# Patient Record
Sex: FEMALE | Race: WHITE | NOT HISPANIC OR LATINO | Employment: UNEMPLOYED | URBAN - METROPOLITAN AREA
[De-identification: names, ages, dates, MRNs, and addresses within clinical notes are randomized per-mention and may not be internally consistent; named-entity substitution may affect disease eponyms.]

---

## 2023-04-26 DIAGNOSIS — E78.5 DYSLIPIDEMIA: Primary | ICD-10-CM

## 2023-04-26 PROBLEM — K86.1 CHRONIC PANCREATITIS (MULTI): Status: ACTIVE | Noted: 2023-04-26

## 2023-04-26 PROBLEM — M54.16 LUMBAR RADICULAR PAIN: Status: ACTIVE | Noted: 2023-04-26

## 2023-04-26 PROBLEM — G89.29 BACK PAIN, CHRONIC: Status: ACTIVE | Noted: 2023-04-26

## 2023-04-26 PROBLEM — M15.9 OSTEOARTHRITIS OF MULTIPLE JOINTS: Status: ACTIVE | Noted: 2023-04-26

## 2023-04-26 PROBLEM — R48.2 APRAXIA OF SPEECH: Status: ACTIVE | Noted: 2023-04-26

## 2023-04-26 PROBLEM — M50.33 OTHER CERVICAL DISC DEGENERATION, CERVICOTHORACIC REGION: Status: ACTIVE | Noted: 2023-04-26

## 2023-04-26 PROBLEM — R53.83 FATIGUE: Status: ACTIVE | Noted: 2023-04-26

## 2023-04-26 PROBLEM — F10.21 HISTORY OF ALCOHOLISM (MULTI): Status: ACTIVE | Noted: 2023-04-26

## 2023-04-26 PROBLEM — R25.9 ABNORMAL MOVEMENT: Status: ACTIVE | Noted: 2023-04-26

## 2023-04-26 PROBLEM — M62.81 MUSCLE WEAKNESS: Status: ACTIVE | Noted: 2023-04-26

## 2023-04-26 PROBLEM — R22.0 FACIAL SWELLING: Status: ACTIVE | Noted: 2023-04-26

## 2023-04-26 PROBLEM — M24.159 LABRAL TEAR OF HIP, DEGENERATIVE: Status: ACTIVE | Noted: 2023-04-26

## 2023-04-26 PROBLEM — K21.9 GERD (GASTROESOPHAGEAL REFLUX DISEASE): Status: ACTIVE | Noted: 2023-04-26

## 2023-04-26 PROBLEM — R10.31 ABDOMINAL PAIN, ACUTE, RIGHT LOWER QUADRANT: Status: ACTIVE | Noted: 2023-04-26

## 2023-04-26 PROBLEM — R10.84 GENERALIZED ABDOMINAL PAIN: Status: ACTIVE | Noted: 2023-04-26

## 2023-04-26 PROBLEM — M25.50 MULTIPLE JOINT PAIN: Status: ACTIVE | Noted: 2023-04-26

## 2023-04-26 PROBLEM — M54.9 BACK PAIN, CHRONIC: Status: ACTIVE | Noted: 2023-04-26

## 2023-04-26 PROBLEM — G37.9 CNS DEMYELINATION (MULTI): Status: ACTIVE | Noted: 2023-04-26

## 2023-04-26 PROBLEM — R10.31 RIGHT GROIN PAIN: Status: ACTIVE | Noted: 2023-04-26

## 2023-04-26 PROBLEM — R90.89 ABNORMAL BRAIN MRI: Status: ACTIVE | Noted: 2023-04-26

## 2023-04-26 PROBLEM — H55.00 NYSTAGMUS: Status: ACTIVE | Noted: 2023-04-26

## 2023-04-26 PROBLEM — E55.9 VITAMIN D DEFICIENCY: Status: ACTIVE | Noted: 2023-04-26

## 2023-04-26 PROBLEM — R25.2 SPASTICITY: Status: ACTIVE | Noted: 2023-04-26

## 2023-04-26 PROBLEM — R11.2 NAUSEA AND VOMITING: Status: ACTIVE | Noted: 2023-04-26

## 2023-04-26 PROBLEM — R20.0 RIGHT ARM NUMBNESS: Status: ACTIVE | Noted: 2023-04-26

## 2023-04-26 PROBLEM — M99.01 CERVICAL SOMATIC DYSFUNCTION: Status: ACTIVE | Noted: 2023-04-26

## 2023-04-26 PROBLEM — F44.4 FUNCTIONAL NEUROLOGICAL SYMPTOM DISORDER WITH ABNORMAL MOVEMENT: Status: ACTIVE | Noted: 2023-04-26

## 2023-04-26 PROBLEM — M51.34 DEGENERATION OF INTERVERTEBRAL DISC OF THORACIC REGION: Status: ACTIVE | Noted: 2023-04-26

## 2023-04-26 PROBLEM — M79.7 FIBROMYALGIA: Status: ACTIVE | Noted: 2023-04-26

## 2023-04-26 PROBLEM — R13.12 OROPHARYNGEAL DYSPHAGIA: Status: ACTIVE | Noted: 2023-04-26

## 2023-04-26 PROBLEM — R10.13 ACUTE EPIGASTRIC PAIN: Status: ACTIVE | Noted: 2023-04-26

## 2023-04-26 PROBLEM — F12.90 MARIJUANA USE: Status: ACTIVE | Noted: 2023-04-26

## 2023-04-26 PROBLEM — M79.18 MYOFASCIAL PAIN SYNDROME: Status: ACTIVE | Noted: 2023-04-26

## 2023-04-26 PROBLEM — M25.551 HIP PAIN, RIGHT: Status: ACTIVE | Noted: 2023-04-26

## 2023-04-26 PROBLEM — R52 CHRONIC PAIN OF MULTIPLE SITES: Status: ACTIVE | Noted: 2023-04-26

## 2023-04-26 PROBLEM — S40.019A SHOULDER CONTUSION: Status: ACTIVE | Noted: 2023-04-26

## 2023-04-26 PROBLEM — M75.01 ADHESIVE CAPSULITIS OF RIGHT SHOULDER: Status: ACTIVE | Noted: 2023-04-26

## 2023-04-26 PROBLEM — M54.12 CERVICAL RADICULOPATHY: Status: ACTIVE | Noted: 2023-04-26

## 2023-04-26 PROBLEM — K29.00 ACUTE SUPERFICIAL GASTRITIS: Status: ACTIVE | Noted: 2023-04-26

## 2023-04-26 PROBLEM — M47.814 THORACIC SPONDYLOSIS: Status: ACTIVE | Noted: 2023-04-26

## 2023-04-26 PROBLEM — G56.02 CARPAL TUNNEL SYNDROME OF LEFT WRIST: Status: ACTIVE | Noted: 2023-04-26

## 2023-04-26 PROBLEM — G25.9 EXTRAPYRAMIDAL MOVEMENTS PRESENT: Status: ACTIVE | Noted: 2023-04-26

## 2023-04-26 PROBLEM — G35 MULTIPLE SCLEROSIS (MULTI): Status: ACTIVE | Noted: 2023-04-26

## 2023-04-26 PROBLEM — G25.9 MOVEMENT DISORDER: Status: ACTIVE | Noted: 2023-04-26

## 2023-04-26 PROBLEM — M51.369 OTHER INTERVERTEBRAL DISC DEGENERATION, LUMBAR REGION: Status: ACTIVE | Noted: 2023-04-26

## 2023-04-26 PROBLEM — M51.9 LUMBAR DISC DISEASE: Status: ACTIVE | Noted: 2023-04-26

## 2023-04-26 PROBLEM — S76.011A TEAR OF RIGHT GLUTEUS MEDIUS TENDON: Status: ACTIVE | Noted: 2023-04-26

## 2023-04-26 PROBLEM — R29.898 WEAKNESS OF RIGHT LOWER EXTREMITY: Status: ACTIVE | Noted: 2023-04-26

## 2023-04-26 PROBLEM — M51.36 OTHER INTERVERTEBRAL DISC DEGENERATION, LUMBAR REGION: Status: ACTIVE | Noted: 2023-04-26

## 2023-04-26 PROBLEM — R63.4 WEIGHT LOSS, ABNORMAL: Status: ACTIVE | Noted: 2023-04-26

## 2023-04-26 PROBLEM — M62.838 MUSCLE SPASM: Status: ACTIVE | Noted: 2023-04-26

## 2023-04-26 PROBLEM — M54.6 THORACIC SPINE PAIN: Status: ACTIVE | Noted: 2023-04-26

## 2023-04-26 PROBLEM — M99.03 SOMATIC DYSFUNCTION OF LUMBAR REGION: Status: ACTIVE | Noted: 2023-04-26

## 2023-04-26 PROBLEM — M25.562 LEFT KNEE PAIN: Status: ACTIVE | Noted: 2023-04-26

## 2023-04-26 PROBLEM — C52: Status: ACTIVE | Noted: 2023-04-26

## 2023-04-26 PROBLEM — M99.02 SEGMENTAL AND SOMATIC DYSFUNCTION OF THORACIC REGION: Status: ACTIVE | Noted: 2023-04-26

## 2023-04-26 PROBLEM — C79.82: Status: ACTIVE | Noted: 2023-04-26

## 2023-04-26 PROBLEM — M47.892 OTHER SPONDYLOSIS, CERVICAL REGION: Status: ACTIVE | Noted: 2023-04-26

## 2023-04-26 PROBLEM — K86.9 DISORDER OF PANCREATIC DUCT (HHS-HCC): Status: ACTIVE | Noted: 2023-04-26

## 2023-04-26 PROBLEM — M47.816 LUMBAR SPONDYLOSIS: Status: ACTIVE | Noted: 2023-04-26

## 2023-04-26 PROBLEM — M25.511 RIGHT SHOULDER PAIN: Status: ACTIVE | Noted: 2023-04-26

## 2023-04-26 PROBLEM — G93.9 CHRONIC NON-SPECIFIC WHITE MATTER LESIONS ON MRI: Status: ACTIVE | Noted: 2023-04-26

## 2023-04-26 PROBLEM — M79.10 MYALGIA: Status: ACTIVE | Noted: 2023-04-26

## 2023-04-26 PROBLEM — E05.90 HYPERTHYROIDISM: Status: ACTIVE | Noted: 2023-04-26

## 2023-04-26 PROBLEM — R93.5 ABDOMINAL ULTRASOUND, ABNORMAL: Status: ACTIVE | Noted: 2023-04-26

## 2023-04-26 PROBLEM — M79.609 PAIN IN EXTREMITY: Status: ACTIVE | Noted: 2023-04-26

## 2023-04-26 PROBLEM — N32.81 OAB (OVERACTIVE BLADDER): Status: ACTIVE | Noted: 2023-04-26

## 2023-04-26 PROBLEM — S46.919A SHOULDER STRAIN: Status: ACTIVE | Noted: 2023-04-26

## 2023-04-26 PROBLEM — M70.61 GREATER TROCHANTERIC BURSITIS, RIGHT: Status: ACTIVE | Noted: 2023-04-26

## 2023-04-26 PROBLEM — G95.9 CERVICAL MYELOPATHY (MULTI): Status: ACTIVE | Noted: 2023-04-26

## 2023-04-26 PROBLEM — R21 RASH: Status: ACTIVE | Noted: 2023-04-26

## 2023-04-26 PROBLEM — R29.2 HYPER REFLEXIA: Status: ACTIVE | Noted: 2023-04-26

## 2023-04-26 PROBLEM — R27.8 DECREASED COORDINATION: Status: ACTIVE | Noted: 2023-04-26

## 2023-04-26 PROBLEM — R29.898 RIGHT ARM WEAKNESS: Status: ACTIVE | Noted: 2023-04-26

## 2023-04-26 PROBLEM — G89.29 CHRONIC PAIN OF MULTIPLE SITES: Status: ACTIVE | Noted: 2023-04-26

## 2023-04-26 PROBLEM — R13.10 DYSPHAGIA: Status: ACTIVE | Noted: 2023-04-26

## 2023-04-26 PROBLEM — R10.11 ABDOMINAL PAIN, ACUTE, RIGHT UPPER QUADRANT: Status: ACTIVE | Noted: 2023-04-26

## 2023-04-26 PROBLEM — G52.1 GLOSSOPHARYNGEAL NERVE DISORDER: Status: ACTIVE | Noted: 2023-04-26

## 2023-04-26 PROBLEM — F41.8 DEPRESSION WITH ANXIETY: Status: ACTIVE | Noted: 2023-04-26

## 2023-04-26 RX ORDER — ATORVASTATIN CALCIUM 40 MG/1
TABLET, FILM COATED ORAL
Qty: 90 TABLET | Refills: 0 | Status: SHIPPED | OUTPATIENT
Start: 2023-04-26 | End: 2023-09-08 | Stop reason: SDUPTHER

## 2023-05-22 ENCOUNTER — TELEPHONE (OUTPATIENT)
Dept: PRIMARY CARE | Facility: CLINIC | Age: 53
End: 2023-05-22
Payer: MEDICARE

## 2023-05-22 DIAGNOSIS — R11.0 NAUSEA: ICD-10-CM

## 2023-05-22 DIAGNOSIS — N30.00 ACUTE CYSTITIS WITHOUT HEMATURIA: Primary | ICD-10-CM

## 2023-05-22 RX ORDER — PROMETHAZINE HYDROCHLORIDE 12.5 MG/1
12.5 TABLET ORAL EVERY 6 HOURS PRN
Qty: 30 TABLET | Refills: 0 | Status: SHIPPED | OUTPATIENT
Start: 2023-05-22 | End: 2023-05-23 | Stop reason: SDUPTHER

## 2023-05-22 RX ORDER — CIPROFLOXACIN 500 MG/1
500 TABLET ORAL 2 TIMES DAILY
Qty: 10 TABLET | Refills: 0 | Status: SHIPPED | OUTPATIENT
Start: 2023-05-22 | End: 2023-05-23 | Stop reason: SDUPTHER

## 2023-05-22 NOTE — TELEPHONE ENCOUNTER
Pt states she was in the hospital and found out she had a UTI. She is now having the same symptoms again. Are you able to call in an antibiotic, or do you need to see her?  They also gave her something for nausea from the UTI. Could you please call that in as well? It is promethazine HCL 12.5 mg

## 2023-05-23 RX ORDER — CIPROFLOXACIN 500 MG/1
500 TABLET ORAL 2 TIMES DAILY
Qty: 10 TABLET | Refills: 0 | Status: SHIPPED | OUTPATIENT
Start: 2023-05-23 | End: 2023-05-28

## 2023-05-23 RX ORDER — PROMETHAZINE HYDROCHLORIDE 12.5 MG/1
12.5 TABLET ORAL EVERY 6 HOURS PRN
Qty: 30 TABLET | Refills: 0 | Status: SHIPPED | OUTPATIENT
Start: 2023-05-23 | End: 2023-05-30

## 2023-05-25 LAB — URINE CULTURE: NO GROWTH

## 2023-06-17 DIAGNOSIS — G56.02 CARPAL TUNNEL SYNDROME OF LEFT WRIST: Primary | ICD-10-CM

## 2023-06-19 RX ORDER — GABAPENTIN 100 MG/1
CAPSULE ORAL
Qty: 270 CAPSULE | Refills: 1 | Status: SHIPPED | OUTPATIENT
Start: 2023-06-19 | End: 2023-11-14 | Stop reason: SDUPTHER

## 2023-06-28 LAB
BACTERIA, URINE: ABNORMAL /HPF
MUCUS, URINE: ABNORMAL /LPF
RBC, URINE: 1 /HPF (ref 0–5)
SQUAMOUS EPITHELIAL CELLS, URINE: 1 /HPF
WBC, URINE: <1 /HPF (ref 0–5)

## 2023-06-29 LAB — URINE CULTURE: NO GROWTH

## 2023-08-03 LAB — URINE CULTURE: NORMAL

## 2023-09-08 DIAGNOSIS — E78.5 DYSLIPIDEMIA: ICD-10-CM

## 2023-09-08 RX ORDER — ATORVASTATIN CALCIUM 40 MG/1
40 TABLET, FILM COATED ORAL NIGHTLY
Qty: 90 TABLET | Refills: 0 | Status: SHIPPED | OUTPATIENT
Start: 2023-09-08 | End: 2023-10-12

## 2023-09-22 ENCOUNTER — HOSPITAL ENCOUNTER (OUTPATIENT)
Facility: HOSPITAL | Age: 53
Setting detail: OUTPATIENT SURGERY
End: 2023-09-22
Attending: STUDENT IN AN ORGANIZED HEALTH CARE EDUCATION/TRAINING PROGRAM | Admitting: STUDENT IN AN ORGANIZED HEALTH CARE EDUCATION/TRAINING PROGRAM
Payer: MEDICARE

## 2023-10-05 ENCOUNTER — HOSPITAL ENCOUNTER (OUTPATIENT)
Dept: RADIOLOGY | Facility: HOSPITAL | Age: 53
Discharge: HOME | End: 2023-10-05
Payer: MEDICARE

## 2023-10-05 DIAGNOSIS — M25.571 PAIN IN RIGHT ANKLE AND JOINTS OF RIGHT FOOT: ICD-10-CM

## 2023-10-05 PROCEDURE — 73610 X-RAY EXAM OF ANKLE: CPT | Mod: RT,FY

## 2023-10-05 PROCEDURE — 73610 X-RAY EXAM OF ANKLE: CPT | Mod: RIGHT SIDE | Performed by: RADIOLOGY

## 2023-10-10 DIAGNOSIS — E78.5 DYSLIPIDEMIA: ICD-10-CM

## 2023-10-12 RX ORDER — ATORVASTATIN CALCIUM 40 MG/1
40 TABLET, FILM COATED ORAL NIGHTLY
Qty: 90 TABLET | Refills: 3 | Status: SHIPPED | OUTPATIENT
Start: 2023-10-12 | End: 2023-11-14 | Stop reason: SDUPTHER

## 2023-10-20 ENCOUNTER — LAB (OUTPATIENT)
Dept: LAB | Facility: LAB | Age: 53
End: 2023-10-20
Payer: MEDICARE

## 2023-10-20 ENCOUNTER — TELEPHONE (OUTPATIENT)
Dept: UROLOGY | Facility: CLINIC | Age: 53
End: 2023-10-20

## 2023-10-20 DIAGNOSIS — R30.0 DYSURIA: Primary | ICD-10-CM

## 2023-10-20 DIAGNOSIS — R30.0 DYSURIA: ICD-10-CM

## 2023-10-20 PROCEDURE — 87186 SC STD MICRODIL/AGAR DIL: CPT

## 2023-10-20 PROCEDURE — 87086 URINE CULTURE/COLONY COUNT: CPT

## 2023-10-24 DIAGNOSIS — N39.0 RECURRENT UTI: Primary | ICD-10-CM

## 2023-10-24 LAB — BACTERIA UR CULT: ABNORMAL

## 2023-10-24 RX ORDER — NITROFURANTOIN 25; 75 MG/1; MG/1
CAPSULE ORAL
Qty: 100 CAPSULE | Refills: 0 | Status: SHIPPED
Start: 2023-10-24 | End: 2024-02-07 | Stop reason: ALTCHOICE

## 2023-11-03 ENCOUNTER — HOSPITAL ENCOUNTER (OUTPATIENT)
Dept: RADIOLOGY | Facility: HOSPITAL | Age: 53
Discharge: HOME | End: 2023-11-03
Payer: MEDICARE

## 2023-11-03 DIAGNOSIS — M19.071 PRIMARY OSTEOARTHRITIS, RIGHT ANKLE AND FOOT: ICD-10-CM

## 2023-11-03 DIAGNOSIS — M76.61 ACHILLES TENDINITIS, RIGHT LEG: ICD-10-CM

## 2023-11-03 DIAGNOSIS — M25.571 PAIN IN RIGHT ANKLE AND JOINTS OF RIGHT FOOT: ICD-10-CM

## 2023-11-03 DIAGNOSIS — M79.671 PAIN IN RIGHT FOOT: ICD-10-CM

## 2023-11-03 DIAGNOSIS — M76.71 PERONEAL TENDINITIS, RIGHT LEG: ICD-10-CM

## 2023-11-03 PROCEDURE — 73630 X-RAY EXAM OF FOOT: CPT | Mod: RT,FY

## 2023-11-03 PROCEDURE — 73610 X-RAY EXAM OF ANKLE: CPT | Mod: RIGHT SIDE | Performed by: RADIOLOGY

## 2023-11-03 PROCEDURE — 73610 X-RAY EXAM OF ANKLE: CPT | Mod: RT

## 2023-11-03 PROCEDURE — 73630 X-RAY EXAM OF FOOT: CPT | Mod: RIGHT SIDE | Performed by: RADIOLOGY

## 2023-11-14 ENCOUNTER — OFFICE VISIT (OUTPATIENT)
Dept: PRIMARY CARE | Facility: CLINIC | Age: 53
End: 2023-11-14
Payer: MEDICARE

## 2023-11-14 VITALS
SYSTOLIC BLOOD PRESSURE: 112 MMHG | WEIGHT: 145 LBS | HEART RATE: 81 BPM | HEIGHT: 63 IN | BODY MASS INDEX: 25.69 KG/M2 | DIASTOLIC BLOOD PRESSURE: 68 MMHG

## 2023-11-14 DIAGNOSIS — G56.02 CARPAL TUNNEL SYNDROME OF LEFT WRIST: ICD-10-CM

## 2023-11-14 DIAGNOSIS — I10 PRIMARY HYPERTENSION: Primary | ICD-10-CM

## 2023-11-14 DIAGNOSIS — F10.21 HISTORY OF ALCOHOLISM (MULTI): ICD-10-CM

## 2023-11-14 DIAGNOSIS — G95.9 CERVICAL MYELOPATHY (MULTI): ICD-10-CM

## 2023-11-14 DIAGNOSIS — C52: ICD-10-CM

## 2023-11-14 DIAGNOSIS — G37.9 CNS DEMYELINATION (MULTI): ICD-10-CM

## 2023-11-14 DIAGNOSIS — Z12.31 BREAST CANCER SCREENING BY MAMMOGRAM: ICD-10-CM

## 2023-11-14 DIAGNOSIS — Z13.220 LIPID SCREENING: ICD-10-CM

## 2023-11-14 DIAGNOSIS — Z00.00 MEDICARE ANNUAL WELLNESS VISIT, SUBSEQUENT: ICD-10-CM

## 2023-11-14 DIAGNOSIS — E55.9 VITAMIN D DEFICIENCY: ICD-10-CM

## 2023-11-14 DIAGNOSIS — E78.5 DYSLIPIDEMIA: ICD-10-CM

## 2023-11-14 DIAGNOSIS — C79.82: ICD-10-CM

## 2023-11-14 DIAGNOSIS — I73.9 PERIPHERAL VASCULAR DISEASE, UNSPECIFIED (CMS-HCC): ICD-10-CM

## 2023-11-14 DIAGNOSIS — E05.90 HYPERTHYROIDISM: ICD-10-CM

## 2023-11-14 PROBLEM — L40.9 SCALP PSORIASIS: Status: RESOLVED | Noted: 2023-11-14 | Resolved: 2023-11-14

## 2023-11-14 PROBLEM — M79.609 PAIN IN EXTREMITY: Status: RESOLVED | Noted: 2023-11-14 | Resolved: 2023-11-14

## 2023-11-14 PROBLEM — H92.01 OTALGIA, RIGHT: Status: RESOLVED | Noted: 2020-10-15 | Resolved: 2023-11-14

## 2023-11-14 PROBLEM — L57.0 ACTINIC KERATOSIS: Status: RESOLVED | Noted: 2017-08-30 | Resolved: 2023-11-14

## 2023-11-14 PROBLEM — R21 VULVAR RASH: Status: RESOLVED | Noted: 2023-11-14 | Resolved: 2023-11-14

## 2023-11-14 PROBLEM — L82.0 INFLAMED SEBORRHEIC KERATOSIS: Status: RESOLVED | Noted: 2017-08-30 | Resolved: 2023-11-14

## 2023-11-14 PROBLEM — L29.9 ITCHY SCALP: Status: RESOLVED | Noted: 2023-11-14 | Resolved: 2023-11-14

## 2023-11-14 PROBLEM — R10.9 RIGHT FLANK PAIN: Status: RESOLVED | Noted: 2023-11-14 | Resolved: 2023-11-14

## 2023-11-14 PROBLEM — J38.1 VOCAL CORD POLYPS: Status: RESOLVED | Noted: 2020-10-15 | Resolved: 2023-11-14

## 2023-11-14 PROBLEM — L57.9 SKIN CHANGES DUE TO CHRONIC EXPOSURE TO NONIONIZING RADIATION, UNSPECIFIED: Status: RESOLVED | Noted: 2017-08-30 | Resolved: 2023-11-14

## 2023-11-14 PROBLEM — R07.81 RIB PAIN ON RIGHT SIDE: Status: RESOLVED | Noted: 2023-11-14 | Resolved: 2023-11-14

## 2023-11-14 PROBLEM — N39.0 UTI (URINARY TRACT INFECTION): Status: RESOLVED | Noted: 2023-11-14 | Resolved: 2023-11-14

## 2023-11-14 PROBLEM — R39.15 URINARY URGENCY: Status: RESOLVED | Noted: 2023-11-14 | Resolved: 2023-11-14

## 2023-11-14 PROBLEM — M26.609 TMJ DYSFUNCTION: Status: ACTIVE | Noted: 2020-10-15

## 2023-11-14 PROBLEM — D22.39 MELANOCYTIC NEVI OF OTHER PARTS OF FACE: Status: ACTIVE | Noted: 2017-08-30

## 2023-11-14 PROBLEM — M50.30 OTHER CERVICAL DISC DEGENERATION, UNSPECIFIED CERVICAL REGION: Status: ACTIVE | Noted: 2023-04-26

## 2023-11-14 PROBLEM — L82.1 OTHER SEBORRHEIC KERATOSIS: Status: RESOLVED | Noted: 2017-08-30 | Resolved: 2023-11-14

## 2023-11-14 PROCEDURE — 3078F DIAST BP <80 MM HG: CPT | Performed by: FAMILY MEDICINE

## 2023-11-14 PROCEDURE — 3074F SYST BP LT 130 MM HG: CPT | Performed by: FAMILY MEDICINE

## 2023-11-14 PROCEDURE — 1036F TOBACCO NON-USER: CPT | Performed by: FAMILY MEDICINE

## 2023-11-14 PROCEDURE — G0439 PPPS, SUBSEQ VISIT: HCPCS | Performed by: FAMILY MEDICINE

## 2023-11-14 RX ORDER — BACLOFEN 10 MG/1
TABLET ORAL
COMMUNITY

## 2023-11-14 RX ORDER — BUPRENORPHINE HYDROCHLORIDE 75 UG/1
FILM, SOLUBLE BUCCAL EVERY 8 HOURS
COMMUNITY
Start: 2021-08-10

## 2023-11-14 RX ORDER — LIDOCAINE 40 MG/G
CREAM TOPICAL EVERY 8 HOURS
COMMUNITY

## 2023-11-14 RX ORDER — DEXTROMETHORPHAN HYDROBROMIDE, GUAIFENESIN 5; 100 MG/5ML; MG/5ML
LIQUID ORAL EVERY 8 HOURS
COMMUNITY

## 2023-11-14 RX ORDER — CHOLECALCIFEROL (VITAMIN D3) 125 MCG
1 CAPSULE ORAL EVERY 24 HOURS
COMMUNITY

## 2023-11-14 RX ORDER — ATORVASTATIN CALCIUM 40 MG/1
40 TABLET, FILM COATED ORAL NIGHTLY
Qty: 90 TABLET | Refills: 3 | Status: SHIPPED | OUTPATIENT
Start: 2023-11-14

## 2023-11-14 RX ORDER — MIRABEGRON 50 MG/1
TABLET, FILM COATED, EXTENDED RELEASE ORAL EVERY 24 HOURS
COMMUNITY
Start: 2022-05-24

## 2023-11-14 RX ORDER — GABAPENTIN 600 MG/1
TABLET ORAL
COMMUNITY
End: 2023-11-14 | Stop reason: ALTCHOICE

## 2023-11-14 RX ORDER — GABAPENTIN 100 MG/1
CAPSULE ORAL
Qty: 270 CAPSULE | Refills: 1 | Status: SHIPPED | OUTPATIENT
Start: 2023-11-14

## 2023-11-14 RX ORDER — ONDANSETRON 8 MG/1
TABLET, ORALLY DISINTEGRATING ORAL EVERY 8 HOURS
COMMUNITY
Start: 2022-08-02 | End: 2023-11-14 | Stop reason: ALTCHOICE

## 2023-11-14 RX ORDER — CLOTRIMAZOLE AND BETAMETHASONE DIPROPIONATE 10; .64 MG/G; MG/G
CREAM TOPICAL
COMMUNITY
Start: 2023-08-02

## 2023-11-14 RX ORDER — TRIAMCINOLONE ACETONIDE 0.25 MG/G
OINTMENT TOPICAL
COMMUNITY
Start: 2020-03-04

## 2023-11-14 RX ORDER — HYDROXYZINE HYDROCHLORIDE 25 MG/1
TABLET, FILM COATED ORAL EVERY 12 HOURS
COMMUNITY
Start: 2021-08-21

## 2023-11-14 RX ORDER — NALOXONE HYDROCHLORIDE 4 MG/.1ML
SPRAY NASAL
COMMUNITY

## 2023-11-14 RX ORDER — ASPIRIN 81 MG/1
TABLET ORAL EVERY 24 HOURS
COMMUNITY

## 2023-11-14 ASSESSMENT — ENCOUNTER SYMPTOMS
PALPITATIONS: 0
DIARRHEA: 0
ABDOMINAL PAIN: 0
NERVOUS/ANXIOUS: 0
NAUSEA: 0
BACK PAIN: 0
FEVER: 0
ARTHRALGIAS: 0
HEADACHES: 0
VOMITING: 0
MYALGIAS: 0
CONSTIPATION: 0
SHORTNESS OF BREATH: 0
CHILLS: 0
DYSPHORIC MOOD: 0
DEPRESSION: 0
WEAKNESS: 1
DIZZINESS: 0
FATIGUE: 0
LOSS OF SENSATION IN FEET: 0
OCCASIONAL FEELINGS OF UNSTEADINESS: 0
COUGH: 0

## 2023-11-14 ASSESSMENT — ACTIVITIES OF DAILY LIVING (ADL)
BATHING: INDEPENDENT
TAKING_MEDICATION: INDEPENDENT
DOING_HOUSEWORK: INDEPENDENT
DRESSING: INDEPENDENT
GROCERY_SHOPPING: INDEPENDENT
MANAGING_FINANCES: INDEPENDENT

## 2023-11-14 ASSESSMENT — PATIENT HEALTH QUESTIONNAIRE - PHQ9
2. FEELING DOWN, DEPRESSED OR HOPELESS: NOT AT ALL
1. LITTLE INTEREST OR PLEASURE IN DOING THINGS: NOT AT ALL
SUM OF ALL RESPONSES TO PHQ9 QUESTIONS 1 AND 2: 0

## 2023-11-14 NOTE — ASSESSMENT & PLAN NOTE
This condition has been assessed and is worsening per patient. Has a follow up with neurology in January.

## 2023-11-14 NOTE — PROGRESS NOTES
"Subjective   Reason for Visit: Mayela Carson is an 53 y.o. female here for a Medicare Wellness visit.     Past Medical, Surgical, and Family History reviewed and updated in chart.    Reviewed all medications by prescribing practitioner or clinical pharmacist (such as prescriptions, OTCs, herbal therapies and supplements) and documented in the medical record.    Needs medication refills    Is due for blood work        Patient Care Team:  YANELI Rogers-CNP as PCP - General (Family Medicine)  Ankita Beckham MD as PCP - [employer] PCP     Review of Systems   Constitutional:  Negative for chills, fatigue and fever.   Eyes:  Negative for visual disturbance.   Respiratory:  Negative for cough and shortness of breath.    Cardiovascular:  Negative for chest pain and palpitations.   Gastrointestinal:  Negative for abdominal pain, constipation, diarrhea, nausea and vomiting.   Musculoskeletal:  Positive for gait problem. Negative for arthralgias, back pain and myalgias.   Skin:  Negative for rash.   Neurological:  Positive for weakness. Negative for dizziness, syncope and headaches.   Psychiatric/Behavioral:  Negative for dysphoric mood. The patient is not nervous/anxious.        Objective   Vitals:  /68 (BP Location: Right arm, Patient Position: Sitting)   Pulse 81   Ht 1.6 m (5' 3\")   Wt 65.8 kg (145 lb)   BMI 25.69 kg/m²       Physical Exam  Constitutional:       Appearance: Normal appearance.   HENT:      Head: Normocephalic and atraumatic.   Cardiovascular:      Rate and Rhythm: Normal rate and regular rhythm.      Heart sounds: No murmur heard.     No gallop.   Pulmonary:      Effort: Pulmonary effort is normal. No respiratory distress.      Breath sounds: Normal breath sounds.   Abdominal:      General: Bowel sounds are normal. There is no distension.      Tenderness: There is no abdominal tenderness.   Musculoskeletal:         General: Normal range of motion.   Skin:     " General: Skin is warm and dry.      Findings: No lesion or rash.   Neurological:      General: No focal deficit present.      Mental Status: She is alert and oriented to person, place, and time. Mental status is at baseline.      Motor: Weakness present.      Coordination: Coordination abnormal.      Gait: Gait abnormal.   Psychiatric:         Mood and Affect: Mood normal.         Behavior: Behavior normal.         Assessment/Plan   Problem List Items Addressed This Visit       Carpal tunnel syndrome of left wrist    Dyslipidemia

## 2023-11-29 ENCOUNTER — APPOINTMENT (OUTPATIENT)
Dept: OBSTETRICS AND GYNECOLOGY | Facility: CLINIC | Age: 53
End: 2023-11-29
Payer: MEDICARE

## 2023-12-05 ENCOUNTER — TELEPHONE (OUTPATIENT)
Dept: UROLOGY | Facility: CLINIC | Age: 53
End: 2023-12-05
Payer: MEDICARE

## 2023-12-05 NOTE — TELEPHONE ENCOUNTER
Allyson, patient is cancelling her surgery with Juan F that is scheduled on 12/20, please call to reschedule.  She is not going to be in town on 12/20  Thanks, Ramona

## 2023-12-11 ENCOUNTER — HOSPITAL ENCOUNTER (OUTPATIENT)
Dept: RADIOLOGY | Facility: HOSPITAL | Age: 53
Discharge: HOME | End: 2023-12-11
Payer: MEDICARE

## 2023-12-11 DIAGNOSIS — Z12.31 BREAST CANCER SCREENING BY MAMMOGRAM: ICD-10-CM

## 2023-12-11 PROCEDURE — 77067 SCR MAMMO BI INCL CAD: CPT | Performed by: RADIOLOGY

## 2023-12-11 PROCEDURE — 77063 BREAST TOMOSYNTHESIS BI: CPT | Performed by: RADIOLOGY

## 2023-12-11 PROCEDURE — 77067 SCR MAMMO BI INCL CAD: CPT

## 2024-01-29 ENCOUNTER — APPOINTMENT (OUTPATIENT)
Dept: NEUROLOGY | Facility: HOSPITAL | Age: 54
End: 2024-01-29
Payer: MEDICARE

## 2024-02-05 ENCOUNTER — TELEMEDICINE CLINICAL SUPPORT (OUTPATIENT)
Dept: PREADMISSION TESTING | Facility: HOSPITAL | Age: 54
End: 2024-02-05
Payer: MEDICARE

## 2024-02-05 ENCOUNTER — TELEPHONE (OUTPATIENT)
Dept: PRIMARY CARE | Facility: CLINIC | Age: 54
End: 2024-02-05

## 2024-02-05 NOTE — TELEPHONE ENCOUNTER
She is schedule to see you in may but she has been having migraines really to where she is throwing up and they last around 10 hours please advise

## 2024-02-06 NOTE — TELEPHONE ENCOUNTER
She will be here tomorrow at 2, said she isnt taking anything because she is already on a lot of meds for different things.

## 2024-02-07 ENCOUNTER — OFFICE VISIT (OUTPATIENT)
Dept: PRIMARY CARE | Facility: CLINIC | Age: 54
End: 2024-02-07
Payer: MEDICARE

## 2024-02-07 VITALS
HEIGHT: 63 IN | BODY MASS INDEX: 26.4 KG/M2 | DIASTOLIC BLOOD PRESSURE: 80 MMHG | SYSTOLIC BLOOD PRESSURE: 116 MMHG | HEART RATE: 76 BPM | WEIGHT: 149 LBS

## 2024-02-07 DIAGNOSIS — G43.019 INTRACTABLE MIGRAINE WITHOUT AURA AND WITHOUT STATUS MIGRAINOSUS: Primary | ICD-10-CM

## 2024-02-07 DIAGNOSIS — G37.9 CNS DEMYELINATION (MULTI): ICD-10-CM

## 2024-02-07 DIAGNOSIS — I73.9 PERIPHERAL VASCULAR DISEASE, UNSPECIFIED (CMS-HCC): ICD-10-CM

## 2024-02-07 DIAGNOSIS — K86.1 OTHER CHRONIC PANCREATITIS (MULTI): ICD-10-CM

## 2024-02-07 DIAGNOSIS — G95.9 CERVICAL MYELOPATHY (MULTI): ICD-10-CM

## 2024-02-07 PROCEDURE — 3074F SYST BP LT 130 MM HG: CPT | Performed by: FAMILY MEDICINE

## 2024-02-07 PROCEDURE — 99213 OFFICE O/P EST LOW 20 MIN: CPT | Performed by: FAMILY MEDICINE

## 2024-02-07 PROCEDURE — 3079F DIAST BP 80-89 MM HG: CPT | Performed by: FAMILY MEDICINE

## 2024-02-07 PROCEDURE — 1036F TOBACCO NON-USER: CPT | Performed by: FAMILY MEDICINE

## 2024-02-07 RX ORDER — PROMETHAZINE HYDROCHLORIDE 12.5 MG/1
12.5 TABLET ORAL EVERY 6 HOURS PRN
Qty: 30 TABLET | Refills: 0 | Status: SHIPPED | OUTPATIENT
Start: 2024-02-07 | End: 2024-02-15

## 2024-02-07 RX ORDER — PREDNISONE 20 MG/1
TABLET ORAL
Qty: 18 TABLET | Refills: 0 | Status: SHIPPED
Start: 2024-02-07 | End: 2024-05-14 | Stop reason: ALTCHOICE

## 2024-02-07 RX ORDER — SUMATRIPTAN 50 MG/1
50 TABLET, FILM COATED ORAL ONCE AS NEEDED
Qty: 27 TABLET | Refills: 0 | Status: SHIPPED | OUTPATIENT
Start: 2024-02-07 | End: 2025-02-06

## 2024-02-07 ASSESSMENT — COLUMBIA-SUICIDE SEVERITY RATING SCALE - C-SSRS
1. IN THE PAST MONTH, HAVE YOU WISHED YOU WERE DEAD OR WISHED YOU COULD GO TO SLEEP AND NOT WAKE UP?: NO
6. HAVE YOU EVER DONE ANYTHING, STARTED TO DO ANYTHING, OR PREPARED TO DO ANYTHING TO END YOUR LIFE?: NO
2. HAVE YOU ACTUALLY HAD ANY THOUGHTS OF KILLING YOURSELF?: NO

## 2024-02-07 ASSESSMENT — PATIENT HEALTH QUESTIONNAIRE - PHQ9
2. FEELING DOWN, DEPRESSED OR HOPELESS: NOT AT ALL
SUM OF ALL RESPONSES TO PHQ9 QUESTIONS 1 AND 2: 0
1. LITTLE INTEREST OR PLEASURE IN DOING THINGS: NOT AT ALL

## 2024-02-07 ASSESSMENT — ENCOUNTER SYMPTOMS
LOSS OF SENSATION IN FEET: 0
OCCASIONAL FEELINGS OF UNSTEADINESS: 0
DEPRESSION: 0

## 2024-02-07 NOTE — PROGRESS NOTES
"Subjective   Patient ID: Mayela Carson is a 53 y.o. female who presents for Follow-up (Has had migraines, and hasn't been taking anything besides what she is prescribed.  Once the migraine starts if she takes tylenol she throws it up almost immediately.  Has never had headaches before and now has since sept.  Neuro postponed her appt.).    Presents with migraine that started about 10 days ago, endorses headache with photosensitivity and sensitivity to noise. Has been having migraines since about September that are new for her. No specific triggers. Typically takes tylenol at onset that helps however had tried taking tylenol for this migraine but had thrown up. Endorses accompanying \"hazy\" vision. Had seen eye doctor and had updated eye exam and was told everything was fine with her eyes. Denies fever, chills, cold like symptoms. Has an appointment for neurology but not until next month.          Review of Systems   All other systems reviewed and are negative.      Objective   /80 (BP Location: Right arm, Patient Position: Sitting)   Pulse 76   Ht 1.6 m (5' 3\")   Wt 67.6 kg (149 lb)   BMI 26.39 kg/m²     Physical Exam  Constitutional:       Appearance: Normal appearance.   HENT:      Head: Normocephalic and atraumatic.   Eyes:      Pupils: Pupils are equal, round, and reactive to light.   Cardiovascular:      Rate and Rhythm: Normal rate and regular rhythm.      Heart sounds: No murmur heard.     No gallop.   Pulmonary:      Effort: Pulmonary effort is normal. No respiratory distress.      Breath sounds: Normal breath sounds.   Musculoskeletal:         General: Normal range of motion.   Skin:     General: Skin is warm and dry.      Findings: No lesion or rash.   Neurological:      General: No focal deficit present.      Mental Status: She is alert and oriented to person, place, and time. Mental status is at baseline.      Cranial Nerves: Cranial nerves 2-12 are intact.   Psychiatric:         Mood and Affect: " Mood normal.         Behavior: Behavior normal.         Assessment/Plan   Problem List Items Addressed This Visit             ICD-10-CM    Chronic pancreatitis (CMS/HCC) K86.1    CNS demyelination (CMS/HCC) G37.9    Cervical myelopathy (CMS/HCC) G95.9    Peripheral vascular disease, unspecified (CMS/HCC) I73.9     Other Visit Diagnoses         Codes    Intractable migraine without aura and without status migrainosus    -  Primary G43.019    Relevant Medications    predniSONE (Deltasone) 20 mg tablet    promethazine (Phenergan) 12.5 mg tablet    SUMAtriptan (Imitrex) 50 mg tablet

## 2024-02-09 ENCOUNTER — TELEPHONE (OUTPATIENT)
Dept: PRIMARY CARE | Facility: CLINIC | Age: 54
End: 2024-02-09
Payer: MEDICARE

## 2024-02-14 NOTE — TELEPHONE ENCOUNTER
Guillermina Perez, APRN-KALI Larson MA  Caller: Unspecified (5 days ago, 11:07 AM)  Please clarify, is med being requested?    Cologuard testing is not due until the end of the year, insurance may not cover this until November of this year as then it will be three years.

## 2024-02-14 NOTE — TELEPHONE ENCOUNTER
Just wanted the test and said thank you she put it in her calendar, she just didn't want to miss it

## 2024-02-15 ENCOUNTER — TELEPHONE (OUTPATIENT)
Dept: UROLOGY | Facility: CLINIC | Age: 54
End: 2024-02-15
Payer: MEDICARE

## 2024-02-19 NOTE — PROGRESS NOTES
CHIEF COMPLAINT: FUV         HISTORY OF PRESENT ILLNESS:  Mayela Carson is a 54 y/o female presenting on 2/20/24 for a follow up to discuss medication alternatives. Patient wants to hold off on surgery as she's having migraines and having this worked up. She is still on the medication and finds it is helping her symptoms. Taking every night before bed.          Past Medical History  She has a past medical history of Actinic keratosis (08/30/2017), Anxiety disorder, unspecified, Cancer involving uterine cervix by direct extension from vagina (CMS/[facility]) (04/26/2023), History of alcoholism (CMS/[facility]) (04/26/2023), History of cervical cancer (10/15/2010), Itchy scalp (11/14/2023), Nonallopathic lesion of thoracolumbar region (01/10/2011), Otalgia, right (10/15/2020), Other seborrheic keratosis (08/30/2017), Pain (01/10/2011), Pain in extremity (11/14/2023), Personal history of malignant neoplasm, unspecified, Personal history of other diseases of the musculoskeletal system and connective tissue (11/20/2019), Rib pain on right side (11/14/2023), Right flank pain (11/14/2023), Scalp psoriasis (11/14/2023), Skin changes due to chronic exposure to nonionizing radiation, unspecified (08/30/2017), Urinary urgency (11/14/2023), UTI (urinary tract infection) (11/14/2023), and Vulvar rash (11/14/2023).    Surgical History  She has a past surgical history that includes Other surgical history (09/14/2016) and Hysterectomy (09/14/2016).     Social History  She reports that she has quit smoking. Her smoking use included cigarettes. She has never used smokeless tobacco. She reports current drug use. Drug: Marijuana. She reports that she does not drink alcohol.    Family History  No family history on file.     Allergies  Buprenorphine and Pregabalin      A comprehensive 10+ review of systems was negative except for: see hpi                          PHYSICAL EXAMINATION:  BP Readings from Last 3 Encounters:   02/07/24 116/80   11/14/23  "112/68   06/28/23 144/80      Wt Readings from Last 3 Encounters:   02/07/24 67.6 kg (149 lb)   11/14/23 65.8 kg (145 lb)   06/28/23 67.1 kg (148 lb)      BMI:   Estimated body mass index is 26.39 kg/m² as calculated from the following:    Height as of 2/7/24: 1.6 m (5' 3\").    Weight as of 2/7/24: 67.6 kg (149 lb).  BSA:   Estimated body surface area is 1.73 meters squared as calculated from the following:    Height as of 2/7/24: 1.6 m (5' 3\").    Weight as of 2/7/24: 67.6 kg (149 lb).  HEENT: Normocephalic, atraumatic, PER EOMI, nonicteric, trachea normal, thyroid normal, oropharynx normal.  CARDIAC: regular rate & rhythm, S1 & S2 normal.  No heaves, thrills, gallops or murmurs.  LUNGS: Clear to auscultation, no spinal or CV tenderness.  EXTREMITIES: No evidence of cyanosis, clubbing or edema.           Provider Impressions:  53-year-old with mixed urinary incontinence in the setting of possible demyelinating disease and hypertonic pelvic floor     [place]     She is doing relatively well with Myrbetriq, will continue this     Continues to have JAYSHREE, positive UDS, wants to have sling     Sling placement on 2/23 canceled until she figures out what is contributing to migraines       CPP:  -May be contributing at least partially to her urinary symptoms  -Patient will continue physical therapy.     Follow up in 3 months       Ashok Rogel MD    By signing my name below, IRachael Scribe   attest that this documentation has been prepared under the direction and in the presence of Dr. Ashok Rogel.     "

## 2024-02-20 ENCOUNTER — OFFICE VISIT (OUTPATIENT)
Dept: UROLOGY | Facility: CLINIC | Age: 54
End: 2024-02-20
Payer: MEDICARE

## 2024-02-20 DIAGNOSIS — N39.3 SUI (STRESS URINARY INCONTINENCE, FEMALE): Primary | ICD-10-CM

## 2024-02-20 DIAGNOSIS — R10.2 PELVIC PAIN: ICD-10-CM

## 2024-02-20 DIAGNOSIS — N32.81 OAB (OVERACTIVE BLADDER): ICD-10-CM

## 2024-02-20 DIAGNOSIS — M62.89 PELVIC FLOOR DYSFUNCTION: ICD-10-CM

## 2024-02-20 PROCEDURE — 99213 OFFICE O/P EST LOW 20 MIN: CPT | Performed by: STUDENT IN AN ORGANIZED HEALTH CARE EDUCATION/TRAINING PROGRAM

## 2024-02-20 PROCEDURE — 1036F TOBACCO NON-USER: CPT | Performed by: STUDENT IN AN ORGANIZED HEALTH CARE EDUCATION/TRAINING PROGRAM

## 2024-03-02 DIAGNOSIS — K21.9 GASTROESOPHAGEAL REFLUX DISEASE, UNSPECIFIED WHETHER ESOPHAGITIS PRESENT: Primary | ICD-10-CM

## 2024-03-05 RX ORDER — PANTOPRAZOLE SODIUM 40 MG/1
TABLET, DELAYED RELEASE ORAL
Qty: 120 TABLET | Refills: 2 | Status: SHIPPED | OUTPATIENT
Start: 2024-03-05

## 2024-03-21 ENCOUNTER — OFFICE VISIT (OUTPATIENT)
Dept: NEUROLOGY | Facility: CLINIC | Age: 54
End: 2024-03-21
Payer: MEDICARE

## 2024-03-21 VITALS
BODY MASS INDEX: 26.57 KG/M2 | RESPIRATION RATE: 18 BRPM | HEART RATE: 64 BPM | SYSTOLIC BLOOD PRESSURE: 162 MMHG | DIASTOLIC BLOOD PRESSURE: 75 MMHG | WEIGHT: 150 LBS

## 2024-03-21 DIAGNOSIS — M48.02 CERVICAL STENOSIS OF SPINAL CANAL: ICD-10-CM

## 2024-03-21 DIAGNOSIS — G93.9 CHRONIC NON-SPECIFIC WHITE MATTER LESIONS ON MRI: Primary | ICD-10-CM

## 2024-03-21 DIAGNOSIS — M79.7 FIBROMYALGIA: ICD-10-CM

## 2024-03-21 PROCEDURE — 3078F DIAST BP <80 MM HG: CPT | Performed by: PSYCHIATRY & NEUROLOGY

## 2024-03-21 PROCEDURE — 1036F TOBACCO NON-USER: CPT | Performed by: PSYCHIATRY & NEUROLOGY

## 2024-03-21 PROCEDURE — 3077F SYST BP >= 140 MM HG: CPT | Performed by: PSYCHIATRY & NEUROLOGY

## 2024-03-21 PROCEDURE — 99205 OFFICE O/P NEW HI 60 MIN: CPT | Performed by: PSYCHIATRY & NEUROLOGY

## 2024-03-21 PROCEDURE — 99417 PROLNG OP E/M EACH 15 MIN: CPT | Performed by: PSYCHIATRY & NEUROLOGY

## 2024-03-21 PROCEDURE — 99215 OFFICE O/P EST HI 40 MIN: CPT | Performed by: PSYCHIATRY & NEUROLOGY

## 2024-03-21 ASSESSMENT — PAIN SCALES - GENERAL: PAINLEVEL: 0-NO PAIN

## 2024-03-21 NOTE — PROGRESS NOTES
History of Present Illness  CC: f/u for ?MS     DISEASE SUMMARY/DIAGNOSTICS:  Most recent MRI brain: 2022, 7/20, 04/1/20, 2018 stable non specific WM changes  Most recent MRI cervical spine: 2022 no cord lesions, 5/20, 2018  1. Similar to mild interval worsening of multilevel degenerative change of the cervical spine. There is moderate spinal canal stenosis at C3-C4 and C5-C6 with mild cord deformation but no corresponding  cord signal abnormality. No abnormal cord enhancement.  2. Unchanged grade 1 degenerative anterolisthesis of C7 on T1 with facet arthropathy.  Most recent MRI thoracic spine: 2022 no cord lesions  1. Scoliosis and moderate multilevel thoracic spondylosis. There is  no spinal canal stenosis with questionable minimal leftward  indentation of the thoracic spinal cord at T7-T8 secondary to focal  disc protrusion. No cord signal abnormality or abnormal enhancement.  2. Additional scattered neural foraminal narrowing as above.    CSF: 2020 OCB negative     History summary from my prior notes in 2020:  Several years ago started having right hip numbness/tingling, it hurts on pushing on the hip, every joint feels numb, hurt. All her movements are limited mostly by pain, 'everything is bone on bone'. Her right foot does not stay straight, dragging it really bad for last 6 months, her body is just shutting down on her. She does not typically fall, but balance is poor. Gets lightheaded when she stands up. Reports skin rashes, on her toes it feels like somebody else is poking her. Has Fatigue. Had lumbar nerve ablation, even ketamine infusion, follows closely with ortho and pain management. Referred here for question of possible MS.     Other symptoms:  left eye keeps getting blurred on and off, never for hours  some trouble swallowing   generalized weakness   muscle spasms in legs and back  trouble with memory, concentration  mood changes, depression but feels good overall and follows with  "psych)  paresthesias from shoulder and hip down on the right side  urinary urgency     Interval Hx:  Last seen by  neuro by Dr. 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   She is having headache that are new and she started having them since September. Son has had headaches. Started imitrex, which helped some episodes, at least two a month. Pain starts from the neck and spreads to the right temple, pulsating, can be \"paralyzing\", stays in the dark, lasts for a couple of days. Stress might trigger them. Has her usual chronic diffuse pain. Reports seizing up of her muscles, has days where she is incapacitated. Does not snore at night. Was diagnosed with CTS in May 2022 on the left and had surgery. Seeing NSGY for now conservative management of mild worsened cervical stenosis (per review of written report of C spine done in 01/24) then reevaluation at [facility]. Also had a new brain MRI in 01/24, stable per written report.    ROS  10-point review of systems was negative except as mentioned in the HPI and/or the  form.     Patient Active Problem List   Diagnosis    Abdominal pain, acute, right lower quadrant    Abdominal pain, acute, right upper quadrant    Generalized abdominal pain    Abdominal ultrasound, abnormal    Abnormal brain MRI    Chronic non-specific white matter lesions on MRI    Acute epigastric pain    Acute superficial gastritis    Adhesive capsulitis of right shoulder    Apraxia of speech    Carpal tunnel syndrome of left wrist    Cervical somatic dysfunction    Somatic dysfunction of lumbar region    Neck pain    Chronic pain of multiple sites    Fibromyalgia    Pain in extremity    Chronic pancreatitis (CMS/HCC)    CNS demyelination (CMS/HCC)    Decreased coordination    Anxiety disorder    Disorder of pancreatic duct    Dyslipidemia    Abnormal movement    Extrapyramidal movements present    Movement disorder    Facial " swelling    Fatigue    Functional neurological symptom disorder with abnormal movement    GERD (gastroesophageal reflux disease)    Glossopharyngeal nerve disorder    Hip pain, right    Hyper reflexia    Hyperthyroidism    Labral tear of hip, degenerative    Left knee pain    Cervical myelopathy (CMS/[facility])    Other cervical disc degeneration, unspecified cervical region    Degeneration of intervertebral disc of thoracic region    Lumbar disc disease    Lumbago-sciatica due to displacement of lumbar intervertebral disc    Lumbar spondylosis    CS (cervical spondylosis)    Other intervertebral disc degeneration, lumbar region    Other spondylosis, cervical region    Thoracic spondylosis    Marijuana use    Multiple joint pain    Muscle spasm    Muscle weakness    Greater trochanteric bursitis, right    Myalgia    Myofascial pain syndrome    Nausea and vomiting in adult    Nystagmus    OAB (overactive bladder)    Swallowing difficulty    Oropharyngeal dysphagia    Osteoarthritis of multiple joints    Rash    Right arm numbness    Right arm weakness    Right groin pain    Right shoulder pain    Segmental and somatic dysfunction of thoracic region    Shoulder contusion    Shoulder strain    Spasticity    Tear of right gluteus medius tendon    Thoracic spine pain    Vitamin D deficiency    Weakness of right lower extremity    Weight loss, abnormal    HTN (hypertension)    Melanocytic nevi of other parts of face    Nicotine dependence    TMJ dysfunction    Peripheral vascular disease, unspecified (CMS/[facility])     Social History     Tobacco Use    Smoking status: Former     Types: Cigarettes    Smokeless tobacco: Never   Vaping Use    Vaping Use: Never used   Substance Use Topics    Alcohol use: Never    Drug use: Yes     Types: Marijuana     Comment: medical card      Allergies   Allergen Reactions    Buprenorphine Unknown    Pregabalin Other       Current Outpatient Medications:     acetaminophen (Tylenol Arthritis Pain) 650  mg ER tablet, every 8 hours., Disp: , Rfl:     aspirin 81 mg EC tablet, once every 24 hours., Disp: , Rfl:     atorvastatin (Lipitor) 40 mg tablet, Take 1 tablet (40 mg) by mouth once daily at bedtime., Disp: 90 tablet, Rfl: 3    baclofen (Lioresal) 10 mg tablet, 1 tablet as needed Orally once at bedtime, may have one extra tab prn 5 days per month for 30 days, Disp: , Rfl:     Belbuca 75 mcg buccal film, every 8 hours., Disp: , Rfl:     cholecalciferol (Vitamin D-3) 125 MCG (5000 UT) capsule, 1 capsule (125 mcg) once every 24 hours., Disp: , Rfl:     clotrimazole-betamethasone (Lotrisone) cream, apply to vulva and surrounding skin 2x/day for 2 weeks, 1x/day for 2 weeks, every other day for 2 weeks, then 3x/week for 2 weeks, Disp: , Rfl:     gabapentin (Neurontin) 100 mg capsule, TAKE 3 CAPSULES THREE TIMES DAILY, Disp: 270 capsule, Rfl: 1    hydrOXYzine HCL (Atarax) 25 mg tablet, every 12 hours., Disp: , Rfl:     lidocaine 4 % cream, every 8 hours., Disp: , Rfl:     Myrbetriq 50 mg tablet extended release 24 hr 24 hr tablet, once every 24 hours., Disp: , Rfl:     naloxone (Narcan) 4 mg/0.1 mL nasal spray, Nasally, Disp: , Rfl:     pantoprazole (ProtoNix) 40 mg EC tablet, TAKE ONE TABLET BY MOUTH TWICE A DAY 30 MINUTES BEFORE BREAKFAST AND DINNER, Disp: 120 tablet, Rfl: 2    predniSONE (Deltasone) 20 mg tablet, Take 3 tabs (60mg) daily for 3 days, then take 2 tabs (40mg) daily for 3 days, then take 1 tab (20mg) daily for 3 days., Disp: 18 tablet, Rfl: 0    SUMAtriptan (Imitrex) 50 mg tablet, Take 1 tablet (50 mg) by mouth 1 time if needed for migraine. May repeat after 2 hours., Disp: 27 tablet, Rfl: 0    triamcinolone (Kenalog) 0.025 % ointment, APPLY SPARINGLY TO AFFECTED AREA(S) 2 TO 3 TIMES DAILY., Disp: , Rfl:     VITAMIN B COMPLEX ORAL, Take 1 capsule by mouth once daily., Disp: , Rfl:    General appearance fidgety, anxious. Heart: regular rate and rhythm. Abdomen: non tender, non distended.     EXAM  BP  162/75 (BP Location: Right arm, Patient Position: Sitting, BP Cuff Size: Adult)   Pulse 64   Resp 18   Wt 68 kg (150 lb)   BMI 26.57 kg/m²     The patient was alert and oriented to person, place, and time with normal language, recent and remote memory.      Visual fields were full to confrontation. Eye movements: normal fixation, no spontaneous or gaze-evoked nystagmus, normal speed and amplitude of horizontal and vertical saccades, no saccadic dysmetria. Facial sensation to light touch and pinprick was normal. Muscles of mastication and facial expression moved normally. Sternocleidomastoid and trapezius power were normal.      There was no pronator drift, no orbiting.      Muscle Strenght (#/5): at times some give-away weakness b/o pain  Upper extremity                              Deltoids Right 5 Left 5  Biceps Right 5 Left 5  Triceps Right 5 Left 5   Right 5 Left 5  ABELARDO Right 5 Left 5  Lower extremity                              Iliopsoas Right 5 Left 5  Quadriceps Right 5 Left 5  Hamstrings Right 5 Left 5  Tibialis ant Right 5 Left 5  Gastrocn Right 5 Left 5     Reflexes:  Upper extremity                              Biceps Right 2+ Left 2+  Triceps Right 2+ Left 2+  Brachiorad Right 2+ Left 2+  Lower extremity                              Patellar Right 3+ Left 3+  Achilles Right 1+ Left 1+     Coordination in the arms was intact     Involuntary movements: atypical movements of all limbs     Light touch, pinprick, were normal today.      Standard gait was antalgic, atypical.     Provider Impressions  55 yo woman with multiple complaints, among which pain in her joints and back is predominant, right flank muscle spasms, seen in the past for possible MS. Hx not classic for relapsing MS, MRI brain nonspecific with stable WM changes over the years (last one I reviewed was in 2022), no cord lesions and neg OCB. Being evaluated by NSGY outside for worsening cervical stenosis. Has new headaches ?cervicogenic  ?migraine, for which she is also following at [facility].   Here to show me the new brain MRI done in 2024 b/o her headaches, to make sure I agree with the report of stable WM changes. She provided me with disc which will be uploaded in our system so that I can compare it with the 2022 brain MRI.  Counseled on timely use of imitrex for headaches.

## 2024-04-29 ENCOUNTER — APPOINTMENT (OUTPATIENT)
Dept: NEUROLOGY | Facility: HOSPITAL | Age: 54
End: 2024-04-29
Payer: MEDICARE

## 2024-05-02 ENCOUNTER — DOCUMENTATION (OUTPATIENT)
Dept: UROLOGY | Facility: CLINIC | Age: 54
End: 2024-05-02
Payer: MEDICARE

## 2024-05-14 ENCOUNTER — OFFICE VISIT (OUTPATIENT)
Dept: PRIMARY CARE | Facility: CLINIC | Age: 54
End: 2024-05-14
Payer: MEDICARE

## 2024-05-14 VITALS
HEIGHT: 63 IN | HEART RATE: 102 BPM | RESPIRATION RATE: 18 BRPM | OXYGEN SATURATION: 98 % | DIASTOLIC BLOOD PRESSURE: 70 MMHG | SYSTOLIC BLOOD PRESSURE: 110 MMHG | WEIGHT: 150.4 LBS | BODY MASS INDEX: 26.65 KG/M2

## 2024-05-14 DIAGNOSIS — G89.29 CHRONIC PAIN OF MULTIPLE SITES: Primary | ICD-10-CM

## 2024-05-14 DIAGNOSIS — E05.90 HYPERTHYROIDISM: ICD-10-CM

## 2024-05-14 DIAGNOSIS — R52 CHRONIC PAIN OF MULTIPLE SITES: Primary | ICD-10-CM

## 2024-05-14 DIAGNOSIS — M47.812 CS (CERVICAL SPONDYLOSIS): ICD-10-CM

## 2024-05-14 DIAGNOSIS — I10 PRIMARY HYPERTENSION: ICD-10-CM

## 2024-05-14 PROCEDURE — 99214 OFFICE O/P EST MOD 30 MIN: CPT | Performed by: FAMILY MEDICINE

## 2024-05-14 PROCEDURE — 3078F DIAST BP <80 MM HG: CPT | Performed by: FAMILY MEDICINE

## 2024-05-14 PROCEDURE — 3074F SYST BP LT 130 MM HG: CPT | Performed by: FAMILY MEDICINE

## 2024-05-14 PROCEDURE — 1036F TOBACCO NON-USER: CPT | Performed by: FAMILY MEDICINE

## 2024-05-14 RX ORDER — DICLOFENAC SODIUM 50 MG/1
50 TABLET, DELAYED RELEASE ORAL 2 TIMES DAILY
Qty: 60 TABLET | Refills: 11 | Status: SHIPPED | OUTPATIENT
Start: 2024-05-14 | End: 2025-05-14

## 2024-05-14 ASSESSMENT — ANXIETY QUESTIONNAIRES
1. FEELING NERVOUS, ANXIOUS, OR ON EDGE: NOT AT ALL
GAD7 TOTAL SCORE: 0
3. WORRYING TOO MUCH ABOUT DIFFERENT THINGS: NOT AT ALL
4. TROUBLE RELAXING: NOT AT ALL
2. NOT BEING ABLE TO STOP OR CONTROL WORRYING: NOT AT ALL
7. FEELING AFRAID AS IF SOMETHING AWFUL MIGHT HAPPEN: NOT AT ALL
6. BECOMING EASILY ANNOYED OR IRRITABLE: NOT AT ALL
5. BEING SO RESTLESS THAT IT IS HARD TO SIT STILL: NOT AT ALL

## 2024-05-14 ASSESSMENT — COLUMBIA-SUICIDE SEVERITY RATING SCALE - C-SSRS
6. HAVE YOU EVER DONE ANYTHING, STARTED TO DO ANYTHING, OR PREPARED TO DO ANYTHING TO END YOUR LIFE?: NO
2. HAVE YOU ACTUALLY HAD ANY THOUGHTS OF KILLING YOURSELF?: NO
1. IN THE PAST MONTH, HAVE YOU WISHED YOU WERE DEAD OR WISHED YOU COULD GO TO SLEEP AND NOT WAKE UP?: NO

## 2024-05-14 ASSESSMENT — PATIENT HEALTH QUESTIONNAIRE - PHQ9
SUM OF ALL RESPONSES TO PHQ9 QUESTIONS 1 AND 2: 0
1. LITTLE INTEREST OR PLEASURE IN DOING THINGS: NOT AT ALL
2. FEELING DOWN, DEPRESSED OR HOPELESS: NOT AT ALL

## 2024-05-14 ASSESSMENT — ENCOUNTER SYMPTOMS
LOSS OF SENSATION IN FEET: 0
DEPRESSION: 0
OCCASIONAL FEELINGS OF UNSTEADINESS: 0

## 2024-05-14 ASSESSMENT — PAIN SCALES - GENERAL: PAINLEVEL: 8

## 2024-05-14 NOTE — PROGRESS NOTES
"Subjective   Patient ID: Mayela Carson is a 54 y.o. female who presents for Follow-up (Mri recently soreness in neck) and Back Pain (8/10).    Presents with migraine that started about 10 days ago, endorses headache with photosensitivity and sensitivity to noise. Has been having migraines since about September that are new for her. No specific triggers. Typically takes tylenol at onset that helps however had tried taking tylenol for this migraine but had thrown up. Endorses accompanying \"hazy\" vision. Had seen eye doctor and had updated eye exam and was told everything was fine with her eyes. Denies fever, chills, cold like symptoms. Has an appointment for neurology but not until next month.     53 y/o female presents for follow up. Was last seen three months ago for migraine like headaches. Was given sumatriptan which she reports is helping with migraine relief if taken at onset. MRI brain and cervical spine was completed 3/2024 and was compared to prior imaging at neurology appointment 3/2024 which confirm stable white matter changes with no other findings.     Continues to follow with pain management for chronic neck and back pain.          Review of Systems   All other systems reviewed and are negative.      Objective   /70 (BP Location: Right arm, Patient Position: Sitting, BP Cuff Size: Adult)   Pulse 102   Resp 18   Ht 1.6 m (5' 3\")   Wt 68.2 kg (150 lb 6.4 oz)   SpO2 98%   BMI 26.64 kg/m²     Physical Exam  Constitutional:       Appearance: Normal appearance.   HENT:      Head: Normocephalic and atraumatic.   Eyes:      Pupils: Pupils are equal, round, and reactive to light.   Cardiovascular:      Rate and Rhythm: Regular rhythm.      Heart sounds: No murmur heard.     No gallop.   Pulmonary:      Effort: Pulmonary effort is normal. No respiratory distress.      Breath sounds: Normal breath sounds.   Musculoskeletal:         General: Normal range of motion.      Cervical back: Muscular tenderness " present.   Skin:     General: Skin is warm and dry.      Findings: No lesion or rash.   Neurological:      General: No focal deficit present.      Mental Status: She is alert and oriented to person, place, and time. Mental status is at baseline.      Cranial Nerves: Cranial nerves 2-12 are intact.   Psychiatric:         Mood and Affect: Mood normal.         Behavior: Behavior normal.         Assessment/Plan   Problem List Items Addressed This Visit             ICD-10-CM    Chronic pain of multiple sites - Primary R52, G89.29    Relevant Medications    diclofenac (Voltaren) 50 mg EC tablet    Other Relevant Orders    Follow Up In [facility] - PCP - Established    Hyperthyroidism E05.90    Relevant Orders    Follow Up In [facility] - PCP - Established    CS (cervical spondylosis) M47.812    Relevant Medications    diclofenac (Voltaren) 50 mg EC tablet    Other Relevant Orders    Follow Up In [facility] - PCP - Established    HTN (hypertension) I10    Relevant Orders    Follow Up In [facility] - PCP - Established

## 2024-05-21 ENCOUNTER — OFFICE VISIT (OUTPATIENT)
Dept: UROLOGY | Facility: CLINIC | Age: 54
End: 2024-05-21
Payer: MEDICARE

## 2024-05-21 DIAGNOSIS — N39.0 URINARY TRACT INFECTION WITHOUT HEMATURIA, SITE UNSPECIFIED: ICD-10-CM

## 2024-05-21 DIAGNOSIS — N32.81 OAB (OVERACTIVE BLADDER): ICD-10-CM

## 2024-05-21 DIAGNOSIS — R30.0 DYSURIA: Primary | ICD-10-CM

## 2024-05-21 DIAGNOSIS — N39.0 RECURRENT UTI: ICD-10-CM

## 2024-05-21 LAB
POC BILIRUBIN, URINE: ABNORMAL
POC BLOOD, URINE: ABNORMAL
POC GLUCOSE, URINE: NEGATIVE MG/DL
POC KETONES, URINE: NEGATIVE MG/DL
POC LEUKOCYTES, URINE: NEGATIVE
POC NITRITE,URINE: NEGATIVE
POC PH, URINE: 5.5 PH
POC PROTEIN, URINE: ABNORMAL MG/DL
POC SPECIFIC GRAVITY, URINE: >=1.03
POC UROBILINOGEN, URINE: 0.2 EU/DL

## 2024-05-21 PROCEDURE — 81003 URINALYSIS AUTO W/O SCOPE: CPT | Performed by: STUDENT IN AN ORGANIZED HEALTH CARE EDUCATION/TRAINING PROGRAM

## 2024-05-21 PROCEDURE — 99214 OFFICE O/P EST MOD 30 MIN: CPT | Performed by: STUDENT IN AN ORGANIZED HEALTH CARE EDUCATION/TRAINING PROGRAM

## 2024-05-21 PROCEDURE — 87086 URINE CULTURE/COLONY COUNT: CPT

## 2024-05-21 RX ORDER — MIRABEGRON 50 MG/1
50 TABLET, EXTENDED RELEASE ORAL DAILY
Qty: 90 TABLET | Refills: 3 | Status: SHIPPED | OUTPATIENT
Start: 2024-05-21 | End: 2025-05-16

## 2024-05-21 RX ORDER — METHENAMINE HIPPURATE 1000 MG/1
1 TABLET ORAL 2 TIMES DAILY
Qty: 60 TABLET | Refills: 11 | Status: SHIPPED | OUTPATIENT
Start: 2024-05-21 | End: 2025-05-21

## 2024-05-21 RX ORDER — ESTRADIOL 0.1 MG/G
CREAM VAGINAL
Qty: 42.5 G | Refills: 12 | Status: SHIPPED | OUTPATIENT
Start: 2024-05-21

## 2024-05-21 NOTE — PROGRESS NOTES
HISTORY OF PRESENT ILLNESS:  Mayela Carson is a 54 y.o. female who presents today for a follow up visit. She states she thinks she currently has a bladder infection. She is not using the estrogen cream.          Past Medical History  She has a past medical history of Actinic keratosis (08/30/2017), Anxiety disorder, unspecified, Cancer involving uterine cervix by direct extension from vagina (Multi) (04/26/2023), History of alcoholism (Multi) (04/26/2023), History of cervical cancer (10/15/2010), Itchy scalp (11/14/2023), Nonallopathic lesion of thoracolumbar region (01/10/2011), Otalgia, right (10/15/2020), Other seborrheic keratosis (08/30/2017), Pain (01/10/2011), Pain in extremity (11/14/2023), Personal history of malignant neoplasm, unspecified, Personal history of other diseases of the musculoskeletal system and connective tissue (11/20/2019), Rib pain on right side (11/14/2023), Right flank pain (11/14/2023), Scalp psoriasis (11/14/2023), Skin changes due to chronic exposure to nonionizing radiation, unspecified (08/30/2017), Urinary urgency (11/14/2023), UTI (urinary tract infection) (11/14/2023), and Vulvar rash (11/14/2023).    Surgical History  She has a past surgical history that includes Other surgical history (09/14/2016) and Hysterectomy (09/14/2016).     Social History  She reports that she has quit smoking. Her smoking use included cigarettes. She has never used smokeless tobacco. She reports current drug use. Drug: Marijuana. She reports that she does not drink alcohol.    Family History  No family history on file.     Allergies  Buprenorphine and Pregabalin      A comprehensive 10+ review of systems was negative except for: see hpi                          PHYSICAL EXAMINATION:  BP Readings from Last 3 Encounters:   05/14/24 110/70   03/21/24 162/75   02/07/24 116/80      Wt Readings from Last 3 Encounters:   05/14/24 68.2 kg (150 lb 6.4 oz)   03/21/24 68 kg (150 lb)   02/07/24 67.6 kg (149 lb)     "  BMI: Estimated body mass index is 26.64 kg/m² as calculated from the following:    Height as of 5/14/24: 1.6 m (5' 3\").    Weight as of 5/14/24: 68.2 kg (150 lb 6.4 oz).  BSA: Estimated body surface area is 1.74 meters squared as calculated from the following:    Height as of 5/14/24: 1.6 m (5' 3\").    Weight as of 5/14/24: 68.2 kg (150 lb 6.4 oz).  HEENT: Normocephalic, atraumatic, PER EOMI, nonicteric, trachea normal, thyroid normal, oropharynx normal.  CARDIAC: regular rate & rhythm, S1 & S2 normal.  No heaves, thrills, gallops or murmurs.  LUNGS: Clear to auscultation, no spinal or CV tenderness.  EXTREMITIES: No evidence of cyanosis, clubbing or edema.               Assessment:  54-year-old with mixed urinary incontinence in the setting of possible demyelinating disease and hypertonic pelvic floor     [place]:   Rx Myrbetriq, doing well      Continues to have JAYSHREE, positive UDS, wants to have sling     Was scheduled to have a sling, wants to hold off on this for now       Dale :  Rx estradiol cream  Rx methenamine   Standing urine culture   Had completed 90 days of Macrobid in the past  Urine culture ordered/sent out 5/21/24       CPP:  -May be contributing at least partially to her urinary symptoms  -Patient will continue physical therapy.        Follow up in 3 months virtually       All questions and concerns were answered and addressed.  The patient expressed understanding and agrees with the plan.     Ashok Rogel MD    Scribe Attestation  By signing my name below, I, Ramón Hernandez   attest that this documentation has been prepared under the direction and in the presence of Ashok Rogel MD.  "

## 2024-05-22 LAB — BACTERIA UR CULT: NORMAL

## 2024-05-23 ENCOUNTER — LAB (OUTPATIENT)
Dept: LAB | Facility: LAB | Age: 54
End: 2024-05-23
Payer: MEDICARE

## 2024-05-23 DIAGNOSIS — Z13.220 LIPID SCREENING: ICD-10-CM

## 2024-05-23 DIAGNOSIS — Z00.00 MEDICARE ANNUAL WELLNESS VISIT, SUBSEQUENT: ICD-10-CM

## 2024-05-23 DIAGNOSIS — I10 PRIMARY HYPERTENSION: ICD-10-CM

## 2024-05-23 DIAGNOSIS — E55.9 VITAMIN D DEFICIENCY: ICD-10-CM

## 2024-05-23 LAB
25(OH)D3 SERPL-MCNC: 26 NG/ML (ref 30–100)
ALBUMIN SERPL BCP-MCNC: 4.3 G/DL (ref 3.4–5)
ALP SERPL-CCNC: 57 U/L (ref 33–110)
ALT SERPL W P-5'-P-CCNC: 14 U/L (ref 7–45)
ANION GAP SERPL CALC-SCNC: 8 MMOL/L (ref 10–20)
AST SERPL W P-5'-P-CCNC: 17 U/L (ref 9–39)
BASOPHILS # BLD AUTO: 0.04 X10*3/UL (ref 0–0.1)
BASOPHILS NFR BLD AUTO: 1.1 %
BILIRUB SERPL-MCNC: 0.5 MG/DL (ref 0–1.2)
BUN SERPL-MCNC: 12 MG/DL (ref 6–23)
CALCIUM SERPL-MCNC: 9.6 MG/DL (ref 8.6–10.3)
CHLORIDE SERPL-SCNC: 104 MMOL/L (ref 98–107)
CHOLEST SERPL-MCNC: 234 MG/DL (ref 0–199)
CHOLESTEROL/HDL RATIO: 3.4
CO2 SERPL-SCNC: 31 MMOL/L (ref 21–32)
CREAT SERPL-MCNC: 0.98 MG/DL (ref 0.5–1.05)
EGFRCR SERPLBLD CKD-EPI 2021: 68 ML/MIN/1.73M*2
EOSINOPHIL # BLD AUTO: 0.18 X10*3/UL (ref 0–0.7)
EOSINOPHIL NFR BLD AUTO: 4.8 %
ERYTHROCYTE [DISTWIDTH] IN BLOOD BY AUTOMATED COUNT: 12.9 % (ref 11.5–14.5)
EST. AVERAGE GLUCOSE BLD GHB EST-MCNC: 120 MG/DL
GLUCOSE SERPL-MCNC: 89 MG/DL (ref 74–99)
HBA1C MFR BLD: 5.8 %
HCT VFR BLD AUTO: 37 % (ref 36–46)
HDLC SERPL-MCNC: 68.9 MG/DL
HGB BLD-MCNC: 12.6 G/DL (ref 12–16)
IMM GRANULOCYTES # BLD AUTO: 0 X10*3/UL (ref 0–0.7)
IMM GRANULOCYTES NFR BLD AUTO: 0 % (ref 0–0.9)
LDLC SERPL CALC-MCNC: 143 MG/DL
LYMPHOCYTES # BLD AUTO: 1.73 X10*3/UL (ref 1.2–4.8)
LYMPHOCYTES NFR BLD AUTO: 46.3 %
MCH RBC QN AUTO: 30.7 PG (ref 26–34)
MCHC RBC AUTO-ENTMCNC: 34.1 G/DL (ref 32–36)
MCV RBC AUTO: 90 FL (ref 80–100)
MONOCYTES # BLD AUTO: 0.38 X10*3/UL (ref 0.1–1)
MONOCYTES NFR BLD AUTO: 10.2 %
NEUTROPHILS # BLD AUTO: 1.41 X10*3/UL (ref 1.2–7.7)
NEUTROPHILS NFR BLD AUTO: 37.6 %
NON HDL CHOLESTEROL: 165 MG/DL (ref 0–149)
NRBC BLD-RTO: 0 /100 WBCS (ref 0–0)
PLATELET # BLD AUTO: 296 X10*3/UL (ref 150–450)
POTASSIUM SERPL-SCNC: 4.2 MMOL/L (ref 3.5–5.3)
PROT SERPL-MCNC: 6.5 G/DL (ref 6.4–8.2)
RBC # BLD AUTO: 4.1 X10*6/UL (ref 4–5.2)
SODIUM SERPL-SCNC: 139 MMOL/L (ref 136–145)
TRIGL SERPL-MCNC: 109 MG/DL (ref 0–149)
TSH SERPL-ACNC: 1.51 MIU/L (ref 0.44–3.98)
VLDL: 22 MG/DL (ref 0–40)
WBC # BLD AUTO: 3.7 X10*3/UL (ref 4.4–11.3)

## 2024-05-23 PROCEDURE — 82306 VITAMIN D 25 HYDROXY: CPT

## 2024-05-23 PROCEDURE — 80061 LIPID PANEL: CPT

## 2024-05-23 PROCEDURE — 36415 COLL VENOUS BLD VENIPUNCTURE: CPT

## 2024-05-23 PROCEDURE — 83036 HEMOGLOBIN GLYCOSYLATED A1C: CPT

## 2024-05-23 PROCEDURE — 85025 COMPLETE CBC W/AUTO DIFF WBC: CPT

## 2024-05-23 PROCEDURE — 80053 COMPREHEN METABOLIC PANEL: CPT

## 2024-05-23 PROCEDURE — 84443 ASSAY THYROID STIM HORMONE: CPT

## 2024-06-05 ENCOUNTER — TELEPHONE (OUTPATIENT)
Dept: UROLOGY | Facility: CLINIC | Age: 54
End: 2024-06-05
Payer: MEDICARE

## 2024-06-05 NOTE — TELEPHONE ENCOUNTER
Patient called, she has not taken the Hiprex due to her not being able to swollow it.  She said you are aware of this and is wanting to know if there is something else that she can take that would be easier for her.  She said that you know her Mom has upcoming surgery and cannot be put in the hospital now  [facility] Bucio  Thank you

## 2024-06-05 NOTE — TELEPHONE ENCOUNTER
Spoke to patient regarding message, she is asking for antibiotic regarding UTI since not able to take the other medication.  She does not want it to get worse since surgery w/her mom is next week  [facility] Bucio  Thank you

## 2024-06-12 ENCOUNTER — TELEPHONE (OUTPATIENT)
Dept: PRIMARY CARE | Facility: CLINIC | Age: 54
End: 2024-06-12
Payer: MEDICARE

## 2024-06-12 NOTE — TELEPHONE ENCOUNTER
----- Message from JANI Rogers sent at 6/12/2024  5:47 PM EDT -----  Cholesterol and sugar are a bit high- for now I recommend lifestyle measures to improve cholesterol and sugar which include regular exercise (150 minutes of activity that produces sweat and increases heart rate per week). Healthy diet low in fat, low cholesterol and carbohydrate restricted to no more then 100 grams per day. All other labs are normal.

## 2024-07-08 ENCOUNTER — TELEPHONE (OUTPATIENT)
Dept: PRIMARY CARE | Facility: CLINIC | Age: 54
End: 2024-07-08
Payer: MEDICARE

## 2024-07-08 DIAGNOSIS — G43.019 INTRACTABLE MIGRAINE WITHOUT AURA AND WITHOUT STATUS MIGRAINOSUS: ICD-10-CM

## 2024-07-08 NOTE — TELEPHONE ENCOUNTER
Med Refill   SUMAtriptan (Imitrex) 50 mg tablet [[id number]]    [facility]  - [place], [place] - [id number] [facility] [address]  Phone: [phone]  Fax: [phone]  SHERWIN #: --     LOV: 5/14/24    NOV: 11/14/24

## 2024-07-09 RX ORDER — SUMATRIPTAN 50 MG/1
50 TABLET, FILM COATED ORAL ONCE AS NEEDED
Qty: 27 TABLET | Refills: 0 | Status: SHIPPED | OUTPATIENT
Start: 2024-07-09 | End: 2025-07-09

## 2024-08-06 ENCOUNTER — TELEPHONE (OUTPATIENT)
Dept: PRIMARY CARE | Facility: CLINIC | Age: 54
End: 2024-08-06
Payer: MEDICARE

## 2024-08-06 NOTE — TELEPHONE ENCOUNTER
She called former Guillermina she is schedule to see you in Nov and she stated that she is having a lot of abdominal pain and she thinks it is not a pulled muscle it has been going on since spring please advise and call her at [phone]

## 2024-08-12 ENCOUNTER — APPOINTMENT (OUTPATIENT)
Dept: PRIMARY CARE | Facility: CLINIC | Age: 54
End: 2024-08-12
Payer: MEDICARE

## 2024-08-12 VITALS
SYSTOLIC BLOOD PRESSURE: 131 MMHG | DIASTOLIC BLOOD PRESSURE: 77 MMHG | WEIGHT: 150 LBS | BODY MASS INDEX: 26.58 KG/M2 | HEART RATE: 75 BPM | HEIGHT: 63 IN

## 2024-08-12 DIAGNOSIS — R10.11 RUQ PAIN: Primary | ICD-10-CM

## 2024-08-12 PROCEDURE — 99214 OFFICE O/P EST MOD 30 MIN: CPT

## 2024-08-12 PROCEDURE — 1036F TOBACCO NON-USER: CPT

## 2024-08-12 PROCEDURE — 3078F DIAST BP <80 MM HG: CPT

## 2024-08-12 PROCEDURE — 3075F SYST BP GE 130 - 139MM HG: CPT

## 2024-08-12 PROCEDURE — 3008F BODY MASS INDEX DOCD: CPT

## 2024-08-12 RX ORDER — DICYCLOMINE HYDROCHLORIDE 20 MG/1
20 TABLET ORAL 3 TIMES DAILY PRN
Qty: 45 TABLET | Refills: 0 | Status: SHIPPED | OUTPATIENT
Start: 2024-08-12 | End: 2024-08-27

## 2024-08-12 ASSESSMENT — PATIENT HEALTH QUESTIONNAIRE - PHQ9
SUM OF ALL RESPONSES TO PHQ9 QUESTIONS 1 AND 2: 0
2. FEELING DOWN, DEPRESSED OR HOPELESS: NOT AT ALL
1. LITTLE INTEREST OR PLEASURE IN DOING THINGS: NOT AT ALL

## 2024-08-12 ASSESSMENT — ENCOUNTER SYMPTOMS
CARDIOVASCULAR NEGATIVE: 1
RESPIRATORY NEGATIVE: 1
LOSS OF SENSATION IN FEET: 0
DEPRESSION: 0
ABDOMINAL PAIN: 1
CONSTITUTIONAL NEGATIVE: 1
OCCASIONAL FEELINGS OF UNSTEADINESS: 0

## 2024-08-12 ASSESSMENT — COLUMBIA-SUICIDE SEVERITY RATING SCALE - C-SSRS
6. HAVE YOU EVER DONE ANYTHING, STARTED TO DO ANYTHING, OR PREPARED TO DO ANYTHING TO END YOUR LIFE?: NO
1. IN THE PAST MONTH, HAVE YOU WISHED YOU WERE DEAD OR WISHED YOU COULD GO TO SLEEP AND NOT WAKE UP?: NO
2. HAVE YOU ACTUALLY HAD ANY THOUGHTS OF KILLING YOURSELF?: NO

## 2024-08-12 NOTE — PROGRESS NOTES
"Subjective   Patient ID: Mayela Carson is a 54 y.o. female who presents for acute visit (Upper abdominal pain X's 2 month ).    HPI 53 YO female here for RUQ pain. Reports having pain over the last 2 months. She was told at that time that her symptoms were MSK. She currently does follow up pain management however she states the pain is different than before. She denies any changes urinary or bowel habits. Diet and ADLs are normal. 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 She has tried OTC medicine and therapy with minimal relief. She is here for further eval.     Review of Systems   Constitutional: Negative.    Respiratory: Negative.     Cardiovascular: Negative.    Gastrointestinal:  Positive for abdominal pain.       Objective   /77 (BP Location: Right arm, Patient Position: Sitting)   Pulse 75   Ht 1.6 m (5' 3\")   Wt 68 kg (150 lb)   BMI 26.57 kg/m²     Physical Exam  Constitutional:       Appearance: Normal appearance. She is normal weight.   Abdominal:       Neurological:      Mental Status: She is alert.         Assessment/Plan   Problem List Items Addressed This Visit    None  Visit Diagnoses       RUQ pain    -  Primary    Relevant Medications    dicyclomine (Bentyl) 20 mg tablet    Other Relevant Orders    Referral to Gastroenterology    US right upper quadrant          Abd exam reveals no pain or tenderness upon palpation. Noted primarily to the RUQ. Given the long hx of abd pain, I do not feel as if this requires an ED visit, rather continue with outpatient POC. Given the pt pain, Dicyclomine 20mg TID PRN for abd pain and spasms, RUQ US ordered to be scheduled, will follow up accordingly, and a GI referral was placed.     Follow up with Pierre BASS as advised.     For any worsening s/s, head to the ED.    This document was generated using the assistance of voice recognition software. If there are any errors of spelling, grammar, syntax, or " meaning; please feel free to contact me directly for clarification.

## 2024-08-13 ENCOUNTER — HOSPITAL ENCOUNTER (OUTPATIENT)
Dept: RADIOLOGY | Facility: HOSPITAL | Age: 54
Discharge: HOME | End: 2024-08-13
Payer: MEDICARE

## 2024-08-13 DIAGNOSIS — R10.11 RUQ PAIN: ICD-10-CM

## 2024-08-13 PROCEDURE — 76705 ECHO EXAM OF ABDOMEN: CPT | Performed by: RADIOLOGY

## 2024-08-13 PROCEDURE — 76705 ECHO EXAM OF ABDOMEN: CPT

## 2024-08-27 ENCOUNTER — APPOINTMENT (OUTPATIENT)
Dept: UROLOGY | Facility: CLINIC | Age: 54
End: 2024-08-27
Payer: MEDICARE

## 2024-08-27 VITALS — HEIGHT: 63 IN | WEIGHT: 150 LBS | BODY MASS INDEX: 26.58 KG/M2

## 2024-08-27 DIAGNOSIS — N39.0 URINARY TRACT INFECTION WITHOUT HEMATURIA, SITE UNSPECIFIED: ICD-10-CM

## 2024-08-27 DIAGNOSIS — N39.0 RECURRENT UTI: Primary | ICD-10-CM

## 2024-08-27 LAB
POC APPEARANCE, URINE: CLEAR
POC BILIRUBIN, URINE: NEGATIVE
POC BLOOD, URINE: ABNORMAL
POC COLOR, URINE: YELLOW
POC GLUCOSE, URINE: NEGATIVE MG/DL
POC KETONES, URINE: NEGATIVE MG/DL
POC LEUKOCYTES, URINE: ABNORMAL
POC NITRITE,URINE: NEGATIVE
POC PH, URINE: 6 PH
POC PROTEIN, URINE: NEGATIVE MG/DL
POC SPECIFIC GRAVITY, URINE: <=1.005
POC UROBILINOGEN, URINE: 0.2 EU/DL

## 2024-08-27 PROCEDURE — 99214 OFFICE O/P EST MOD 30 MIN: CPT | Performed by: STUDENT IN AN ORGANIZED HEALTH CARE EDUCATION/TRAINING PROGRAM

## 2024-08-27 PROCEDURE — 3008F BODY MASS INDEX DOCD: CPT | Performed by: STUDENT IN AN ORGANIZED HEALTH CARE EDUCATION/TRAINING PROGRAM

## 2024-08-27 PROCEDURE — 81003 URINALYSIS AUTO W/O SCOPE: CPT | Performed by: STUDENT IN AN ORGANIZED HEALTH CARE EDUCATION/TRAINING PROGRAM

## 2024-08-27 PROCEDURE — 87186 SC STD MICRODIL/AGAR DIL: CPT

## 2024-08-27 PROCEDURE — G2211 COMPLEX E/M VISIT ADD ON: HCPCS | Performed by: STUDENT IN AN ORGANIZED HEALTH CARE EDUCATION/TRAINING PROGRAM

## 2024-08-27 PROCEDURE — 87086 URINE CULTURE/COLONY COUNT: CPT

## 2024-08-27 RX ORDER — ESTRADIOL 0.1 MG/G
CREAM VAGINAL
Qty: 42.5 G | Refills: 12 | Status: SHIPPED | OUTPATIENT
Start: 2024-08-27

## 2024-08-27 RX ORDER — MIRABEGRON 50 MG/1
50 TABLET, EXTENDED RELEASE ORAL DAILY
Qty: 90 TABLET | Refills: 3 | Status: SHIPPED | OUTPATIENT
Start: 2024-08-27 | End: 2025-08-27

## 2024-08-27 ASSESSMENT — PAIN SCALES - GENERAL: PAINLEVEL: 7

## 2024-08-27 NOTE — PROGRESS NOTES
HISTORY OF PRESENT ILLNESS:  Mayela Carson is a 54 y.o. female who presents today for a follow up visit. She has increased urgency. She is taking her medications. She reports that she is worried about an infection because of her increased urgency and dryness. She is taking a muscle relaxer, but is not having any improvement with her pelvic issues. She is still not interested in surgery at this time.          Past Medical History  She has a past medical history of Actinic keratosis (08/30/2017), Anxiety disorder, unspecified, Cancer involving uterine cervix by direct extension from vagina (Multi) (04/26/2023), History of alcoholism (Multi) (04/26/2023), History of cervical cancer (10/15/2010), Itchy scalp (11/14/2023), Nonallopathic lesion of thoracolumbar region (01/10/2011), Otalgia, right (10/15/2020), Other seborrheic keratosis (08/30/2017), Pain (01/10/2011), Pain in extremity (11/14/2023), Personal history of malignant neoplasm, unspecified, Personal history of other diseases of the musculoskeletal system and connective tissue (11/20/2019), Rib pain on right side (11/14/2023), Right flank pain (11/14/2023), Scalp psoriasis (11/14/2023), Skin changes due to chronic exposure to nonionizing radiation, unspecified (08/30/2017), Urinary urgency (11/14/2023), UTI (urinary tract infection) (11/14/2023), and Vulvar rash (11/14/2023).    Surgical History  She has a past surgical history that includes Other surgical history (09/14/2016) and Hysterectomy (09/14/2016).     Social History  She reports that she has quit smoking. Her smoking use included cigarettes. She has never used smokeless tobacco. She reports current drug use. Drug: Marijuana. She reports that she does not drink alcohol.    Family History  No family history on file.     Allergies  Buprenorphine and Pregabalin      A comprehensive 10+ review of systems was negative except for: see hpi                          PHYSICAL EXAMINATION:  BP Readings from Last 3  "Encounters:   08/12/24 131/77   05/14/24 110/70   03/21/24 162/75      Wt Readings from Last 3 Encounters:   08/27/24 68 kg (150 lb)   08/12/24 68 kg (150 lb)   05/14/24 68.2 kg (150 lb 6.4 oz)      BMI: Estimated body mass index is 26.57 kg/m² as calculated from the following:    Height as of this encounter: 1.6 m (5' 3\").    Weight as of this encounter: 68 kg (150 lb).  BSA: Estimated body surface area is 1.74 meters squared as calculated from the following:    Height as of this encounter: 1.6 m (5' 3\").    Weight as of this encounter: 68 kg (150 lb).  HEENT: Normocephalic, atraumatic, PER EOMI, nonicteric, trachea normal, thyroid normal, oropharynx normal.  CARDIAC: regular rate & rhythm, S1 & S2 normal.  No heaves, thrills, gallops or murmurs.  LUNGS: Clear to auscultation, no spinal or CV tenderness.  EXTREMITIES: No evidence of cyanosis, clubbing or edema.               Assessment:  54-year-old with mixed urinary incontinence in the setting of possible demyelinating disease and hypertonic pelvic floor     [place]:   Rx Myrbetriq, doing well   Continues to have JAYSHREE, positive UDS, wants to have sling  Was scheduled to have a sling, wants to hold off on this for now        Dale :  continue estradiol cream  continue methenamine   Standing urine culture   Had completed 90 days of Macrobid in the past  Urine culture ordered/sent out 8/27/24, if positive will restart abx         CPP:  -May be contributing at least partially to her urinary symptoms  -has completed PT  Somewhat improved        Follow up in 6 months       All questions and concerns were answered and addressed.  The patient expressed understanding and agrees with the plan.     Ashok Rogel MD    Scribe Attestation  By signing my name below, I, Ramón Hernandez   attest that this documentation has been prepared under the direction and in the presence of Ashok Rogel MD.  "

## 2024-08-30 DIAGNOSIS — R30.0 DYSURIA: Primary | ICD-10-CM

## 2024-08-30 LAB — BACTERIA UR CULT: ABNORMAL

## 2024-08-30 RX ORDER — SULFAMETHOXAZOLE AND TRIMETHOPRIM 800; 160 MG/1; MG/1
1 TABLET ORAL 2 TIMES DAILY
Qty: 10 TABLET | Refills: 0 | Status: SHIPPED | OUTPATIENT
Start: 2024-08-30 | End: 2024-09-04

## 2024-09-04 ENCOUNTER — TELEPHONE (OUTPATIENT)
Dept: UROLOGY | Facility: CLINIC | Age: 54
End: 2024-09-04
Payer: MEDICARE

## 2024-09-04 DIAGNOSIS — N39.0 RECURRENT UTI: Primary | ICD-10-CM

## 2024-09-04 RX ORDER — NITROFURANTOIN 25; 75 MG/1; MG/1
100 CAPSULE ORAL 2 TIMES DAILY
Qty: 10 CAPSULE | Refills: 0 | Status: SHIPPED | OUTPATIENT
Start: 2024-09-04 | End: 2024-09-09

## 2024-09-04 NOTE — TELEPHONE ENCOUNTER
Patient states that she couldn't take the antibiotics that you gave her. To large she spit most of them out into the sink. She would like another medication order please. [facility] in [place].  Pt to check with her pharmacy after 6pm.

## 2024-09-11 ENCOUNTER — OFFICE VISIT (OUTPATIENT)
Dept: URGENT CARE | Age: 54
End: 2024-09-11
Payer: MEDICARE

## 2024-09-11 VITALS
SYSTOLIC BLOOD PRESSURE: 135 MMHG | TEMPERATURE: 98.2 F | RESPIRATION RATE: 16 BRPM | OXYGEN SATURATION: 99 % | DIASTOLIC BLOOD PRESSURE: 63 MMHG | HEART RATE: 65 BPM

## 2024-09-11 DIAGNOSIS — N39.0 RECURRENT UTI: ICD-10-CM

## 2024-09-11 DIAGNOSIS — N39.0 URINARY TRACT INFECTION WITHOUT HEMATURIA, SITE UNSPECIFIED: ICD-10-CM

## 2024-09-11 DIAGNOSIS — L23.7 ALLERGIC CONTACT DERMATITIS DUE TO PLANTS, EXCEPT FOOD: Primary | ICD-10-CM

## 2024-09-11 RX ORDER — TRIAMCINOLONE ACETONIDE 1 MG/G
CREAM TOPICAL 2 TIMES DAILY PRN
Qty: 100 G | Refills: 0 | Status: SHIPPED | OUTPATIENT
Start: 2024-09-11 | End: 2024-09-25

## 2024-09-11 RX ORDER — PREDNISONE 10 MG/1
TABLET ORAL
Qty: 35 TABLET | Refills: 0 | Status: SHIPPED | OUTPATIENT
Start: 2024-09-11 | End: 2024-09-12

## 2024-09-11 NOTE — PATIENT INSTRUCTIONS
Take all meds as instructed.    -Avoid using or touching whatever might have caused your rash.    -Protect your skin from anything that might irritate it or cause an allergy. For example, wear gloves if you need to work with harsh soaps.    -Use cool or warm water, not hot, for baths and showers. You can also try a special kind of bath called an oatmeal bath.    -Try using soothing skin products to help with the itching and discomfort. Examples include thick moisturizing cream or petroleum jelly. Put this on your skin right after you get out of the bath or shower and after washing your hands.    -Avoid scratching your skin. It might help to:    -Wear cotton gloves at night.    -Keep your nails short and clean.    -Cover the parts of your skin that itch.         How are skin rashes treated?    -Steroid creams and ointments - These go on the skin, and they relieve itching and redness.    -Steroid pills - You might need to take these for a short time if your rash is severe. Side effects include palpitation, mood swing, vivid dreams, anger hunger, and so on.  Stop Medrol Dosepak if the symptoms develop and follow-up with your primary care provider.         Return or follow up with PCP if you develop the following.    -You have a rash that does not go away within 2 weeks.    -Your rash gets worse or spreads over large parts of your body.    -You have signs of infection like swelling, warmth, pain, or fever.    Pt verbalized understanding of the instructions.

## 2024-09-11 NOTE — PROGRESS NOTES
Subjective   Patient ID: Mayela Carson is a 54 y.o. female. They present today with a chief complaint of Rash (Pt states they have poison ivy or rash on body).    History of Present Illness  Patient reported itchy and burning rash on arms, torso, and right thigh for 1 day after she was outdoor. Wiping with rubbing alcohol did not improve the symptoms. Pt pain, bleeding and discharge.       History provided by:  Patient   used: No    Rash        Past Medical History  Allergies as of 09/11/2024 - Reviewed 08/12/2024   Allergen Reaction Noted    Buprenorphine Unknown 04/26/2023    Pregabalin Other 04/26/2023       (Not in a hospital admission)       Past Medical History:   Diagnosis Date    Actinic keratosis 08/30/2017    Anxiety disorder, unspecified     Anxiety and depression    Cancer involving uterine cervix by direct extension from vagina (Multi) 04/26/2023    History of alcoholism (Multi) 04/26/2023    History of cervical cancer 10/15/2010    Itchy scalp 11/14/2023    Nonallopathic lesion of thoracolumbar region 01/10/2011    Otalgia, right 10/15/2020    Other seborrheic keratosis 08/30/2017    Pain 01/10/2011    Pain in extremity 11/14/2023    Personal history of malignant neoplasm, unspecified     History of malignant neoplasm    Personal history of other diseases of the musculoskeletal system and connective tissue 11/20/2019    History of fibromyalgia    Rib pain on right side 11/14/2023    Right flank pain 11/14/2023    Scalp psoriasis 11/14/2023    Skin changes due to chronic exposure to nonionizing radiation, unspecified 08/30/2017    Urinary urgency 11/14/2023    UTI (urinary tract infection) 11/14/2023    Vulvar rash 11/14/2023       Past Surgical History:   Procedure Laterality Date    HYSTERECTOMY  09/14/2016    Hysterectomy    OTHER SURGICAL HISTORY  09/14/2016    Breast Surgery Enlargement Procedure        reports that she has quit smoking. Her smoking use included cigarettes. She  has never used smokeless tobacco. She reports current drug use. Drug: Marijuana. She reports that she does not drink alcohol.    Review of Systems  Review of Systems   Skin:  Positive for rash.                                  Objective    Vitals:    09/11/24 1624   BP: 135/63   Pulse: 65   Resp: 16   Temp: 36.8 °C (98.2 °F)   SpO2: 99%     No LMP recorded.    Physical Exam  Vitals and nursing note reviewed.   Constitutional:       Appearance: Normal appearance.   Cardiovascular:      Rate and Rhythm: Normal rate.   Pulmonary:      Breath sounds: Normal breath sounds.   Skin:     Findings: Rash present.      Comments: Erythematous papules in linear pattern noted on bilateral upper extremities, torso and lower extremities. No bleeding, discharge or tenderness.    Neurological:      Mental Status: She is alert.         Procedures    Point of Care Test & Imaging Results from this visit  Results for orders placed or performed in visit on 08/27/24   Urine Culture    Specimen: Clean Catch/Voided; Urine   Result Value Ref Range    Urine Culture >100,000 Escherichia coli (A)        Susceptibility    Escherichia coli - MICROSCAN     Ampicillin  Susceptible ug/mL     Cefazolin  Susceptible ug/mL     Cefazolin (uncomplicated UTIs only)  Susceptible ug/mL     Ciprofloxacin  Susceptible ug/mL     Gentamicin  Susceptible ug/mL     Nitrofurantoin  Susceptible ug/mL     Piperacillin/Tazobactam  Susceptible ug/mL     Trimethoprim/Sulfamethoxazole  Susceptible ug/mL   POCT UA Automated manually resulted   Result Value Ref Range    POC Color, Urine Yellow Straw, Yellow, Light-Yellow    POC Appearance, Urine Clear Clear    POC Glucose, Urine NEGATIVE NEGATIVE mg/dl    POC Bilirubin, Urine NEGATIVE NEGATIVE    POC Ketones, Urine NEGATIVE NEGATIVE mg/dl    POC Specific Gravity, Urine <=1.005 1.005 - 1.035    POC Blood, Urine TRACE-Intact (A) NEGATIVE    POC PH, Urine 6.0 No Reference Range Established PH    POC Protein, Urine NEGATIVE  NEGATIVE, 30 (1+) mg/dl    POC Urobilinogen, Urine 0.2 0.2, 1.0 EU/DL    Poc Nitrite, Urine NEGATIVE NEGATIVE    POC Leukocytes, Urine SMALL (1+) (A) NEGATIVE     No results found.    Diagnostic study results (if any) were reviewed by [facility].    Assessment/Plan   Allergies, medications, history, and pertinent labs/EKGs/Imaging reviewed by JANI Silva.     Medical Decision Making      Orders and Diagnoses  Diagnoses and all orders for this visit:  Allergic contact dermatitis due to plants, except food  -     predniSONE (Deltasone) 10 mg tablet; Take 4 tabs by mouth daily for 5 days,  then take 2 tabs by mouth daily for 5 days,  then take 1 tab by mouth daily for 5 days  with food.  -     triamcinolone (Kenalog) 0.1 % cream; Apply topically 2 times a day as needed for rash (itchiness) for up to 14 days.      Medical Admin Record      Follow Up Instructions  Follow up as needed    Patient disposition: Home    Electronically signed by [facility]  6:53 PM

## 2024-09-12 RX ORDER — PREDNISONE 10 MG/1
TABLET ORAL
Qty: 35 TABLET | Refills: 0 | Status: SHIPPED | OUTPATIENT
Start: 2024-09-12

## 2024-09-16 ENCOUNTER — TELEPHONE (OUTPATIENT)
Dept: PRIMARY CARE | Facility: CLINIC | Age: 54
End: 2024-09-16
Payer: MEDICARE

## 2024-09-16 DIAGNOSIS — E78.5 DYSLIPIDEMIA: ICD-10-CM

## 2024-09-16 RX ORDER — ATORVASTATIN CALCIUM 40 MG/1
40 TABLET, FILM COATED ORAL NIGHTLY
Qty: 90 TABLET | Refills: 3 | Status: SHIPPED | OUTPATIENT
Start: 2024-09-16

## 2024-09-16 NOTE — TELEPHONE ENCOUNTER
Patient has poison ivy all over. She went to [facility]. She is on Prednisone and a topical cream. It seems like it is getting worse.    Needs refill of Atorvastatin 40mg 1 daily at bedtime to [facility] in [place].

## 2024-09-18 ENCOUNTER — OFFICE VISIT (OUTPATIENT)
Dept: PRIMARY CARE | Facility: CLINIC | Age: 54
End: 2024-09-18
Payer: MEDICARE

## 2024-09-18 VITALS
WEIGHT: 150 LBS | DIASTOLIC BLOOD PRESSURE: 86 MMHG | HEART RATE: 66 BPM | HEIGHT: 63 IN | BODY MASS INDEX: 26.58 KG/M2 | SYSTOLIC BLOOD PRESSURE: 175 MMHG | OXYGEN SATURATION: 95 %

## 2024-09-18 DIAGNOSIS — L25.5 RHUS DERMATITIS: ICD-10-CM

## 2024-09-18 DIAGNOSIS — Z12.11 COLON CANCER SCREENING: Primary | ICD-10-CM

## 2024-09-18 PROCEDURE — 1036F TOBACCO NON-USER: CPT

## 2024-09-18 PROCEDURE — 3077F SYST BP >= 140 MM HG: CPT

## 2024-09-18 PROCEDURE — 99213 OFFICE O/P EST LOW 20 MIN: CPT

## 2024-09-18 PROCEDURE — 3079F DIAST BP 80-89 MM HG: CPT

## 2024-09-18 PROCEDURE — 3008F BODY MASS INDEX DOCD: CPT

## 2024-09-18 ASSESSMENT — COLUMBIA-SUICIDE SEVERITY RATING SCALE - C-SSRS
6. HAVE YOU EVER DONE ANYTHING, STARTED TO DO ANYTHING, OR PREPARED TO DO ANYTHING TO END YOUR LIFE?: NO
6. HAVE YOU EVER DONE ANYTHING, STARTED TO DO ANYTHING, OR PREPARED TO DO ANYTHING TO END YOUR LIFE?: NO
2. HAVE YOU ACTUALLY HAD ANY THOUGHTS OF KILLING YOURSELF?: NO
1. IN THE PAST MONTH, HAVE YOU WISHED YOU WERE DEAD OR WISHED YOU COULD GO TO SLEEP AND NOT WAKE UP?: NO
2. HAVE YOU ACTUALLY HAD ANY THOUGHTS OF KILLING YOURSELF?: NO
1. IN THE PAST MONTH, HAVE YOU WISHED YOU WERE DEAD OR WISHED YOU COULD GO TO SLEEP AND NOT WAKE UP?: NO

## 2024-09-18 ASSESSMENT — PATIENT HEALTH QUESTIONNAIRE - PHQ9
SUM OF ALL RESPONSES TO PHQ9 QUESTIONS 1 AND 2: 0
SUM OF ALL RESPONSES TO PHQ9 QUESTIONS 1 AND 2: 0
2. FEELING DOWN, DEPRESSED OR HOPELESS: NOT AT ALL
2. FEELING DOWN, DEPRESSED OR HOPELESS: NOT AT ALL
1. LITTLE INTEREST OR PLEASURE IN DOING THINGS: NOT AT ALL
1. LITTLE INTEREST OR PLEASURE IN DOING THINGS: NOT AT ALL

## 2024-09-18 ASSESSMENT — ENCOUNTER SYMPTOMS
DEPRESSION: 0
LOSS OF SENSATION IN FEET: 0
OCCASIONAL FEELINGS OF UNSTEADINESS: 0

## 2024-09-18 NOTE — PATIENT INSTRUCTIONS
Chief Complaint  Pt here for moreno removal S/P TURP      Active Problems    1  BPH with urinary obstruction (600 01,599 69) (N40 1,N13 8)   2  Chronic obstructive pulmonary disease (496) (J44 9)   3  Coronary artery disease (414 00) (I25 10)   4  Diabetes mellitus with peripheral circulatory disorder (250 70,443 81) (E11 51)   5  Hydronephrosis (591) (N13 30)   6  Hypercholesterolemia (272 0) (E78 0)   7  Hypertension (401 9) (I10)   8  Nocturnal polyuria (788 43) (R35 1)   9  Osteoarthritis (715 90) (M19 90)   10  Osteoporosis (733 00) (M81 0)   11  Peripheral arterial disease (443 9) (I73 9)   12  Pre-op evaluation (V72 84) (Z01 818)   13  Preop testing (V72 84) (Z01 818)   14  Sleep related leg cramps (327 52) (G47 62)   15  Urinary tract infection (599 0) (N39 0)    Current Meds   1  Atenolol 50 MG Oral Tablet; TAKE 1 TABLET DAILY; Therapy: 20RIA9997 to (Evaluate:18Jun2016)  Requested for: 76ZYZ1334; Last   Rx:68Yxg0431 Ordered   2  Monie Low Strength 81 MG TBEC; Therapy: (Recorded:63Aok1400) to Recorded   3  Ciprofloxacin HCl - 500 MG Oral Tablet; Take 1 tablet twice daily; Therapy: 68HBX1970 to (Denita García)  Requested for: 13EDT7389; Last   Rx:[id number] Ordered   4  Cyclobenzaprine HCl - 10 MG Oral Tablet; TAKE ONE OR  TWO TABLETS BY MOUTH    DAILY; Therapy: 97QXG7963 to (Megha Lao)  Requested for: 71Flt5166; Last   Rx:[id number] Ordered   5  Finasteride 5 MG Oral Tablet; TAKE 1 TABLET DAILY; Therapy: 91YFE4636 to (Evaluate:29Ojr6215)  Requested for: 94UWP8250; Last   Rx:[id number] Ordered   6  MetFORMIN HCl - 500 MG Oral Tablet; 1 TAB PO QD; Therapy: 43PJY4729 to (Evaluate:34Mvs0513)  Requested for: 68THN4531; Last   Rx:21Mar2016 Ordered   7  OneTouch UltraSoft Lancets Miscellaneous; USE AS DIRECTED; Therapy: 11Apr2014 to (Tuan Neumann)  Requested for: 37GAK2730; Last   Rx:11Apr2014 Ordered   8  Simvastatin 10 MG Oral Tablet; TAKE 1 TABLET IN THE EVENING;    Therapy: Continue with prednisone and cream as needed.  You can get OTC benadryl to take to help with the itching and you can try benadryl cream to put on areas.  Avoid scratching as much as you can.     Assessment/Plan   Problem List Items Addressed This Visit    None  Visit Diagnoses       Colon cancer screening    -  Primary    Relevant Orders    Cologuard® colon cancer screening    Rhus dermatitis               38DBV1880 to (Evaluate:98Ohu3257)  Requested for: 47PPA5194; Last   Rx:[id number] Ordered   9  Tamsulosin HCl - 0 4 MG Oral Capsule; TAKE 1 CAPSULE Bedtime; Therapy: 42IVE9463 to (Last Rx:71Dqj8661)  Requested for: 75LIU4872 Ordered   10  Terazosin HCl - 2 MG Oral Capsule; TAKE 2 CAPSULES DAILY; Therapy: 25GDF5781 to (Evaluate:45Pzm1068)  Requested for: 12UJD2866; Last    Rx:20Piw6293 Ordered   11  Zoster (Zostavax); INJECT 0 65  ML Subcutaneous; Therapy: 04Wtb1791 to (Evaluate:33Ktk1881); Last Rx:33Fmn0329 Ordered    Allergies    1  No Known Drug Allergies    Vitals  Signs [Data Includes: Current Encounter]    Heart Rate: 68  Systolic: 996  Diastolic: 80  Weight: 998 lb   BMI Calculated: 27 73    Procedure    Procedure:  Moreno Cath Removal   removed moreno w/o difficulty      Plan   To return in 1 mo for PVR     Education   Asked pt to drink lots of water, he may have some burning when he urinates, and to seek help at the ER if he is unable to void     Future Appointments    Date/Time Provider Specialty Site   06/13/2016 10:30 AM Demetrius Dailey, DO Internal Medicine [address]   Electronically signed by : Selvin Reed, ; May 16 2016  2:41PM EST                       (Author)    Electronically signed by : JORDAN Ch ; May 16 2016  3:31PM EST                       (Author)

## 2024-09-18 NOTE — PROGRESS NOTES
"Subjective   Patient ID: Mayela Carson is a 54 y.o. female who presents for Follow-up (Poison ivy new patient (she has a appointment in October as a new patient)).    Past Medical, Surgical, and Family History reviewed and updated in chart.     Reviewed all medications by prescribing practitioner or clinical pharmacist (such as prescriptions, OTCs, herbal therapies and supplements) and documented in the medical record.    HPI   Patient in in for poison ivy  Currently is on prednisone pack and has triamcinolone cream from urgent care.  Rash is getting better.  Rash started this past weekend, it is on bilateral arm, abdomen under bra line and bilateral upper thighs.  Endorse was doing yard work and cutting down bushes prior to this happening.   Patient decline steroid injection.    Review of Systems   Skin:  Positive for rash.       Objective   /86   Pulse 66   Ht 1.6 m (5' 3\")   Wt 68 kg (150 lb)   SpO2 95%   BMI 26.57 kg/m²     Physical Exam  Skin:     Findings: Rash present. Rash is macular and urticarial.         Assessment/Plan   Problem List Items Addressed This Visit    None  Visit Diagnoses       Colon cancer screening    -  Primary    Relevant Orders    Cologuard® colon cancer screening    Rhus dermatitis                   "

## 2024-10-02 DIAGNOSIS — K21.9 GASTROESOPHAGEAL REFLUX DISEASE, UNSPECIFIED WHETHER ESOPHAGITIS PRESENT: ICD-10-CM

## 2024-10-02 RX ORDER — PANTOPRAZOLE SODIUM 40 MG/1
TABLET, DELAYED RELEASE ORAL
Qty: 60 TABLET | Refills: 1 | Status: SHIPPED | OUTPATIENT
Start: 2024-10-02 | End: 2024-10-03 | Stop reason: SDUPTHER

## 2024-10-02 NOTE — TELEPHONE ENCOUNTER
Patient not seen since 11/2022.    Refill provided for 2 month supply. After that she will need follow up in the office with Dr. Schaefer or her PCP can assume management of this medication.

## 2024-10-03 ENCOUNTER — APPOINTMENT (OUTPATIENT)
Dept: GASTROENTEROLOGY | Facility: CLINIC | Age: 54
End: 2024-10-03
Payer: MEDICARE

## 2024-10-03 VITALS
HEIGHT: 63 IN | OXYGEN SATURATION: 95 % | WEIGHT: 149 LBS | DIASTOLIC BLOOD PRESSURE: 76 MMHG | BODY MASS INDEX: 26.4 KG/M2 | SYSTOLIC BLOOD PRESSURE: 123 MMHG | HEART RATE: 73 BPM

## 2024-10-03 DIAGNOSIS — K21.9 GASTROESOPHAGEAL REFLUX DISEASE, UNSPECIFIED WHETHER ESOPHAGITIS PRESENT: ICD-10-CM

## 2024-10-03 DIAGNOSIS — R10.11 RUQ PAIN: ICD-10-CM

## 2024-10-03 PROBLEM — G89.29 ABDOMINAL PAIN, CHRONIC, RIGHT UPPER QUADRANT: Status: ACTIVE | Noted: 2023-04-26

## 2024-10-03 PROBLEM — R10.31 ABDOMINAL PAIN, ACUTE, RIGHT LOWER QUADRANT: Status: RESOLVED | Noted: 2023-04-26 | Resolved: 2024-10-03

## 2024-10-03 PROCEDURE — 3008F BODY MASS INDEX DOCD: CPT | Performed by: PHYSICIAN ASSISTANT

## 2024-10-03 PROCEDURE — 3078F DIAST BP <80 MM HG: CPT | Performed by: PHYSICIAN ASSISTANT

## 2024-10-03 PROCEDURE — 99215 OFFICE O/P EST HI 40 MIN: CPT | Performed by: PHYSICIAN ASSISTANT

## 2024-10-03 PROCEDURE — 3074F SYST BP LT 130 MM HG: CPT | Performed by: PHYSICIAN ASSISTANT

## 2024-10-03 RX ORDER — DICYCLOMINE HYDROCHLORIDE 10 MG/1
10 CAPSULE ORAL EVERY 6 HOURS PRN
Qty: 120 CAPSULE | Refills: 2 | Status: SHIPPED | OUTPATIENT
Start: 2024-10-03 | End: 2025-01-01

## 2024-10-03 RX ORDER — PANTOPRAZOLE SODIUM 40 MG/1
40 TABLET, DELAYED RELEASE ORAL
Qty: 60 TABLET | Refills: 1 | Status: SHIPPED | OUTPATIENT
Start: 2024-10-03

## 2024-10-03 NOTE — ASSESSMENT & PLAN NOTE
Patient with very atypical pain. On palpation the pain is actually more flank and rib cage pain. Suspect that this is MSK pain or referred pain from her back. Will try dicyclomine as Dr. Schaefer recommended but she never tried. Will order a CT scan scan to rule out chronic pancreatitis or other causes of pain.

## 2024-10-03 NOTE — PROGRESS NOTES
"Subjective   Patient ID: Mayela Carson is a 54 y.o. female who was referred by PCP for Follow-up (RUQ pain, nausea - last OV 11/21/22 with Dr. Schaefer - EGD done 10/3/22).    Patient's PCP is KALI Frias    PMH: possible multiple sclerosis, fibromyalgia, HLD, anxiety, depression, migraines, and OA    HPI  Patient has been seen by several other GI providers in the past, including Dr. Longo and Dr. Schaefer. She was last seen in November 2022 so patient is established with our office.     Patient has previously been seen for chronic epigastric pain. There was a repored history of chronic pancreatitis, but imaging has not suggested this. Patient was recommended to try dicyclomine 20mg TID. Gastric emptying study with rapid emptying and small meals with low glucose concentration was recommended.    Patient states that she has been having RUQ pain. Patient is a somewhat poor historian. She is accompanied by her mother. She states that this is the pain that has been present for years but \"feels different\". When asked why, she states that it feels \"magi\" and \"let her know it's there\". She denies worsening with eating or bowel movements. She does report a lower appetite. She reports nausea with migraines, but no vomiting. She is having a bowel movement every 3 days. No melena or hematochezia. She has severe back pain and has been having procedures for chronic pain and follows with pain management. She denies melena, hematochezia, weight loss.    Social History:  NSAIDs: Just tylenol arthirits  Tobacco: Former smoker, quit 2 years ago  Alcohol: Denies  Drug use: Reports medical marijuana    Family History: Denies any known family history of GI malignancy.    Prior GI evaluation:  RUQ U/S showed (mildly prominent pancreatic duct at 3 mm 1/2021).   Cologuard negative (11/2021).   HIDA scan normal with GB EF 85% (4/2021).   MRCP showed slight dilation and irregularity of the main PD possibly due to chronic pancreatitis " but otherwise normal (1/2021).   CT A/P w/ contrast unremarkable (1/2021).   Esophagram showed mildly prominent cricopharyngeus and small impression on the posterior pharyngeal wall secondary to endplate spurring at C5-6 (1/2022).   MBS and SLP evaluation showed mild to moderate oropharyngeal dysphagia (1/2021).   US 8/2024: unremarkable  EGD 10/2022: gastritis seen  EUS 3/2021: Some pancreatic parenchyma changes indeterminate for chronic pancreatitis     Review of Systems:  Constitutional: No reported fever, chills, or weight loss.  Skin: No reported icterus, lesions, or rash.  Eye: No reported itching, pain, vision changes.  Ear: No reported discharge, hearing loss, or pain.  Nose: No reported congestion, discharge, or epistaxis.  Mouth/throat: No reported dysphagia, hoarseness, or throat pain.  Resp: No reported cough, dyspnea, or wheezing.  Cardiovascular: No reported chest pain, lower extremity edema, or palpitations.   GI: +ongoing right upper abdominal pain, intermittent nausea with migraines  : No reported dysuria, hematuria, or frequency.  Neuro: No reported confusion, memory loss, headaches, or dizziness.  Psych: No reported anxiety, depression, or insomnia.  Musculoskeletal: +significant back pain  Heme/lymph: No reported easy bleeding or bruising, or swollen lymph nodes.  Endocrine: No reported cold/heat intolerance, polydipsia, or polyuria.     Medications:  Prior to Admission medications    Medication Sig Start Date End Date Taking? Authorizing Provider   acetaminophen (Tylenol Arthritis Pain) 650 mg ER tablet every 8 hours.    Historical Provider, MD   aspirin 81 mg EC tablet once every 24 hours.    Historical Provider, MD   atorvastatin (Lipitor) 40 mg tablet Take 1 tablet (40 mg) by mouth once daily at bedtime. 9/16/24   Fanta Frias, APRN-CNP   baclofen (Lioresal) 10 mg tablet 1 tablet as needed Orally once at bedtime, may have one extra tab prn 5 days per month for 30 days    Historical  Provider, MD   Belbuca 75 mcg buccal film every 8 hours. 8/10/21   Historical Provider, MD   cholecalciferol (Vitamin D-3) 125 MCG (5000 UT) capsule 1 capsule (125 mcg) once every 24 hours.    Historical Provider, MD   clotrimazole-betamethasone (Lotrisone) cream apply to vulva and surrounding skin 2x/day for 2 weeks, 1x/day for 2 weeks, every other day for 2 weeks, then 3x/week for 2 weeks 8/2/23   Historical Provider, MD   diclofenac (Voltaren) 50 mg EC tablet Take 1 tablet (50 mg) by mouth 2 times a day. Do not crush, chew, or split. 5/14/24 5/14/25  JANI Rogers   estradiol (Estrace) 0.01 % (0.1 mg/gram) vaginal cream Insert pea size amount into vagina every night for 2 weeks, then 2x/week after 5/21/24   Ashok Rogel MD   gabapentin (Neurontin) 100 mg capsule TAKE 3 CAPSULES THREE TIMES DAILY 11/14/23   JANI Rogers   hydrOXYzine HCL (Atarax) 25 mg tablet every 12 hours. 8/21/21   Historical Provider, MD   lidocaine 4 % cream every 8 hours.    Historical Provider, MD   methenamine hippurate (Hiprex) 1 gram tablet Take 1 tablet (1 g) by mouth 2 times a day. 5/21/24 5/21/25  Ashok Rogel MD   mirabegron (Myrbetriq) 50 mg tablet extended release 24 hr 24 hr tablet Take 1 tablet (50 mg) by mouth once daily. 5/21/24 5/16/25  Ashok Rogel MD   mirabegron (Myrbetriq) 50 mg tablet extended release 24 hr 24 hr tablet Take 1 tablet (50 mg) by mouth once daily. 8/27/24 8/27/25  Ashok Rogel MD   Myrbetriq 50 mg tablet extended release 24 hr 24 hr tablet once every 24 hours. 5/24/22   Historical Provider, MD   naloxone (Narcan) 4 mg/0.1 mL nasal spray Nasally    Historical Provider, MD   pantoprazole (ProtoNix) 40 mg EC tablet TAKE ONE TABLET BY MOUTH TWICE A DAY, 30 MINUTES BEFORE BREAKFAST AND DINNER 10/2/24   Polo Suarez MD   predniSONE (Deltasone) 10 mg tablet Take 4 tabs by mouth daily for 5 days, then take 2 tabs by mouth daily for 5 days, then take 1 tab by mouth daily  for 5 days with food. 9/12/24   Ruby Anderson, APRN-CNP   SUMAtriptan (Imitrex) 50 mg tablet Take 1 tablet (50 mg) by mouth 1 time if needed for migraine. May repeat after 2 hours. 7/9/24 7/9/25  YANELI Rogers-CNP   triamcinolone (Kenalog) 0.025 % ointment APPLY SPARINGLY TO AFFECTED AREA(S) 2 TO 3 TIMES DAILY. 3/4/20   Historical Provider, MD   VITAMIN B COMPLEX ORAL Take 1 capsule by mouth once daily.    Historical Provider, MD   pantoprazole (ProtoNix) 40 mg EC tablet TAKE ONE TABLET BY MOUTH TWICE A DAY 30 MINUTES BEFORE BREAKFAST AND DINNER 3/5/24 10/2/24  Anton Schaefer DO       Allergies:  Buprenorphine and Pregabalin    Objective   Physical exam:  Constitutional: Well developed, well nourished 54 y.o. year old in no acute distress.   Skin: Warm and dry. Normal turgor. No rash, ulcer, trauma, jaundice.   Eyes: Pupils symmetric and reactive to light.  Respiratory: Clear to auscultation bilaterally. No wheezes, rhonchi, or rales heard.  Cardiovascular: Regular rate and rhythm. S1 and S2 appreciated and normal. No murmur, rub, or gallop heard.   Edema: No edema noted.  GI: Normal bowel sounds. Soft, non-distended, non-tender. No rebound or guarding present. No hepatomegaly or splenomegaly appreciated. Abdominal aorta not palpably abnormal.  Musculoskeletal: Patient is TTP along right ribs and flank. +back pain  Neuro: Alert and oriented x 3. Cranial nerves 2-12 grossly intact.   Psych: Normal mood and affect.        Relevant Results Recent labs reviewed in the EMR.    Radiology: Reviewed imaging reviewed in the EMR.  No results found.    Assessment/Plan   Problem List Items Addressed This Visit             ICD-10-CM    RUQ pain R10.11     Patient with very atypical pain. On palpation the pain is actually more flank and rib cage pain. Suspect that this is MSK pain or referred pain from her back. Will try dicyclomine as Dr. Schaefer recommended but she never tried. Will order a CT scan scan  to rule out chronic pancreatitis or other causes of pain.         Relevant Medications    dicyclomine (Bentyl) 10 mg capsule    Other Relevant Orders    CT abdomen pelvis w IV contrast    Creatinine, Serum    GERD (gastroesophageal reflux disease) K21.9     On BID PPI. Patient states that she doesn't know what this was for. We discussed that it is for reflux/GERD. Will try decreasing to once daily.         Relevant Medications    pantoprazole (ProtoNix) 40 mg EC tablet     Greater than 40 minutes spent reviewing chart day of appointment, face to face interaction with patient, and documentation in note.    Marcia Sharma PA-C

## 2024-10-03 NOTE — ASSESSMENT & PLAN NOTE
On BID PPI. Patient states that she doesn't know what this was for. We discussed that it is for reflux/GERD. Will try decreasing to once daily.

## 2024-10-09 NOTE — TELEPHONE ENCOUNTER
New Med request     Jana Jose 54 - [place], [place] - [address] [facility]  [address] [facility] [address]  Phone: [phone]  Fax: [phone]      NULL

## 2024-10-23 ENCOUNTER — HOSPITAL ENCOUNTER (OUTPATIENT)
Dept: RADIOLOGY | Facility: HOSPITAL | Age: 54
Discharge: HOME | End: 2024-10-23
Payer: MEDICARE

## 2024-10-23 DIAGNOSIS — R10.11 RUQ PAIN: ICD-10-CM

## 2024-10-23 PROCEDURE — 74177 CT ABD & PELVIS W/CONTRAST: CPT

## 2024-10-23 PROCEDURE — 2550000001 HC RX 255 CONTRASTS

## 2024-10-23 PROCEDURE — 74177 CT ABD & PELVIS W/CONTRAST: CPT | Performed by: RADIOLOGY

## 2024-10-28 DIAGNOSIS — R93.3 ABNORMAL CT SCAN, GASTROINTESTINAL TRACT: Primary | ICD-10-CM

## 2024-10-29 ENCOUNTER — TELEPHONE (OUTPATIENT)
Dept: GASTROENTEROLOGY | Facility: CLINIC | Age: 54
End: 2024-10-29
Payer: MEDICARE

## 2024-10-29 ENCOUNTER — TELEPHONE (OUTPATIENT)
Dept: PRIMARY CARE | Facility: CLINIC | Age: 54
End: 2024-10-29
Payer: MEDICARE

## 2024-10-29 DIAGNOSIS — Z12.11 COLON CANCER SCREENING: ICD-10-CM

## 2024-11-14 ENCOUNTER — APPOINTMENT (OUTPATIENT)
Dept: PRIMARY CARE | Facility: CLINIC | Age: 54
End: 2024-11-14
Payer: MEDICARE

## 2024-11-27 DIAGNOSIS — G43.019 INTRACTABLE MIGRAINE WITHOUT AURA AND WITHOUT STATUS MIGRAINOSUS: ICD-10-CM

## 2024-11-27 RX ORDER — SUMATRIPTAN 50 MG/1
50 TABLET, FILM COATED ORAL ONCE AS NEEDED
Qty: 27 TABLET | Refills: 0 | Status: SHIPPED | OUTPATIENT
Start: 2024-11-27 | End: 2025-11-27

## 2024-11-27 NOTE — TELEPHONE ENCOUNTER
Patient called to request medication refill.    Last appointment with our providers: 09/18/2024  Next appointment with our providers: 12/10/2024  Name of Medication:SUMAtriptan (Imitrex) 50 mg tablet      Pharmacy: [facility] [address] [place] [id number]  Phone: [phone]  Fax: [phone]

## 2024-12-03 ENCOUNTER — HOSPITAL ENCOUNTER (OUTPATIENT)
Dept: RADIOLOGY | Facility: HOSPITAL | Age: 54
Discharge: HOME | End: 2024-12-03
Payer: MEDICARE

## 2024-12-03 DIAGNOSIS — R93.3 ABNORMAL CT SCAN, GASTROINTESTINAL TRACT: ICD-10-CM

## 2024-12-03 LAB
CREAT SERPL-MCNC: 1.15 MG/DL (ref 0.6–1.3)
GFR SERPL CREATININE-BSD FRML MDRD: 56 ML/MIN/1.73M*2

## 2024-12-03 PROCEDURE — 74177 CT ABD & PELVIS W/CONTRAST: CPT

## 2024-12-03 PROCEDURE — 82565 ASSAY OF CREATININE: CPT

## 2024-12-03 PROCEDURE — 2550000001 HC RX 255 CONTRASTS

## 2024-12-06 ENCOUNTER — APPOINTMENT (OUTPATIENT)
Dept: PRIMARY CARE | Facility: CLINIC | Age: 54
End: 2024-12-06
Payer: MEDICARE

## 2024-12-10 ENCOUNTER — LAB (OUTPATIENT)
Dept: LAB | Facility: LAB | Age: 54
End: 2024-12-10
Payer: MEDICARE

## 2024-12-10 ENCOUNTER — APPOINTMENT (OUTPATIENT)
Dept: PRIMARY CARE | Facility: CLINIC | Age: 54
End: 2024-12-10
Payer: MEDICARE

## 2024-12-10 DIAGNOSIS — N39.0 RECURRENT UTI: ICD-10-CM

## 2024-12-10 DIAGNOSIS — N39.0 URINARY TRACT INFECTION WITHOUT HEMATURIA, SITE UNSPECIFIED: ICD-10-CM

## 2024-12-10 PROCEDURE — 87086 URINE CULTURE/COLONY COUNT: CPT

## 2024-12-11 LAB — BACTERIA UR CULT: NORMAL

## 2024-12-11 NOTE — RESULT ENCOUNTER NOTE
Spoke with her and let her know culture is negative.  She is still having burning and urine is just trickling out.

## 2024-12-17 ENCOUNTER — TELEPHONE (OUTPATIENT)
Dept: PRIMARY CARE | Facility: CLINIC | Age: 54
End: 2024-12-17
Payer: MEDICARE

## 2024-12-17 LAB — NONINV COLON CA DNA+OCC BLD SCRN STL QL: NEGATIVE

## 2024-12-17 NOTE — TELEPHONE ENCOUNTER
----- Message from Fanta Frias sent at 12/17/2024 11:08 AM EST -----  Negative cologuard results. Recheck every 3 years as recommended

## 2025-01-07 ENCOUNTER — TELEPHONE (OUTPATIENT)
Facility: CLINIC | Age: 55
End: 2025-01-07
Payer: MEDICARE

## 2025-01-07 DIAGNOSIS — K21.9 GASTROESOPHAGEAL REFLUX DISEASE, UNSPECIFIED WHETHER ESOPHAGITIS PRESENT: ICD-10-CM

## 2025-01-07 DIAGNOSIS — G43.019 INTRACTABLE MIGRAINE WITHOUT AURA AND WITHOUT STATUS MIGRAINOSUS: ICD-10-CM

## 2025-01-07 DIAGNOSIS — E78.5 DYSLIPIDEMIA: ICD-10-CM

## 2025-01-07 DIAGNOSIS — R10.11 RUQ PAIN: ICD-10-CM

## 2025-01-07 RX ORDER — DICYCLOMINE HYDROCHLORIDE 10 MG/1
10 CAPSULE ORAL EVERY 6 HOURS PRN
Qty: 120 CAPSULE | Refills: 2 | Status: SHIPPED | OUTPATIENT
Start: 2025-01-07 | End: 2025-04-07

## 2025-01-07 RX ORDER — PANTOPRAZOLE SODIUM 40 MG/1
40 TABLET, DELAYED RELEASE ORAL
Qty: 60 TABLET | Refills: 1 | Status: SHIPPED | OUTPATIENT
Start: 2025-01-07

## 2025-01-07 NOTE — TELEPHONE ENCOUNTER
Refill request for  Dicyclomine 10 mg  Pantoprazole DR 40 mg    [facility]

## 2025-01-09 ENCOUNTER — APPOINTMENT (OUTPATIENT)
Dept: DERMATOLOGY | Facility: CLINIC | Age: 55
End: 2025-01-09
Payer: MEDICARE

## 2025-01-09 DIAGNOSIS — L65.9 NONSCARRING HAIR LOSS: Primary | ICD-10-CM

## 2025-01-09 DIAGNOSIS — Z12.83 ENCOUNTER FOR SCREENING FOR MALIGNANT NEOPLASM OF SKIN: ICD-10-CM

## 2025-01-09 DIAGNOSIS — L82.1 SEBORRHEIC KERATOSIS: ICD-10-CM

## 2025-01-09 DIAGNOSIS — L81.4 LENTIGO: ICD-10-CM

## 2025-01-09 PROCEDURE — 99204 OFFICE O/P NEW MOD 45 MIN: CPT | Performed by: NURSE PRACTITIONER

## 2025-01-09 PROCEDURE — 1036F TOBACCO NON-USER: CPT | Performed by: NURSE PRACTITIONER

## 2025-01-09 RX ORDER — MINOXIDIL 5 %
1 SOLUTION, NON-ORAL TOPICAL DAILY
Qty: 60 ML | Refills: 5 | Status: SHIPPED | OUTPATIENT
Start: 2025-01-09

## 2025-01-09 NOTE — PROGRESS NOTES
Subjective     Mayela Carson is a 54 y.o. female who presents for the following: Alopecia (Pt presents to office accompanied by mother to discuss hair thinning and hair loss. ).     Review of Systems:  No other skin or systemic complaints other than what is documented elsewhere in the note.    The following portions of the chart were reviewed this encounter and updated as appropriate:  Tobacco  Allergies  Meds  Problems  Med Hx  Surg Hx  Fam Hx       Skin Cancer History  No skin cancer on file.    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 (Multi) (04/26/2023), History of alcoholism (Multi) (04/26/2023), History of cervical cancer (10/15/2010), Itchy scalp (11/14/2023), Nonallopathic lesion of thoracolumbar region (01/10/2011), Otalgia, right (10/15/2020), Other seborrheic keratosis (08/30/2017), Pain (01/10/2011), Pain in extremity (11/14/2023), Personal history of malignant neoplasm, unspecified, Personal history of other diseases of the musculoskeletal system and connective tissue (11/20/2019), Rib pain on right side (11/14/2023), Right flank pain (11/14/2023), Scalp psoriasis (11/14/2023), Skin changes due to chronic exposure to nonionizing radiation, unspecified (08/30/2017), Urinary urgency (11/14/2023), UTI (urinary tract infection) (11/14/2023), and Vulvar rash (11/14/2023).    Past Surgical History:  Mayela Carson  has a past surgical history that includes Other surgical history (09/14/2016) and Hysterectomy (09/14/2016).    Family History:  Patient family history is not on file.    Social History:  Mayela Carson  reports that she has quit smoking. Her smoking use included cigarettes. She has never used smokeless tobacco. She  reports current drug use. Drug: Marijuana. She reports that she does not drink alcohol.    Allergies:  Buprenorphine and Pregabalin    Current Medications / CAM's:    Current Outpatient Medications:     acetaminophen (Tylenol Arthritis Pain) 650 mg ER tablet, every 8 hours., Disp: , Rfl:     aspirin 81 mg EC tablet, once every 24 hours., Disp: , Rfl:     atorvastatin (Lipitor) 40 mg tablet, Take 1 tablet (40 mg) by mouth once daily at bedtime., Disp: 90 tablet, Rfl: 3    baclofen (Lioresal) 10 mg tablet, 1 tablet as needed Orally once at bedtime, may have one extra tab prn 5 days per month for 30 days, Disp: , Rfl:     Belbuca 75 mcg buccal film, every 8 hours., Disp: , Rfl:     cholecalciferol (Vitamin D-3) 125 MCG (5000 UT) capsule, 1 capsule (125 mcg) once every 24 hours., Disp: , Rfl:     clotrimazole-betamethasone (Lotrisone) cream, apply to vulva and surrounding skin 2x/day for 2 weeks, 1x/day for 2 weeks, every other day for 2 weeks, then 3x/week for 2 weeks, Disp: , Rfl:     diclofenac (Voltaren) 50 mg EC tablet, Take 1 tablet (50 mg) by mouth 2 times a day. Do not crush, chew, or split., Disp: 60 tablet, Rfl: 11    dicyclomine (Bentyl) 10 mg capsule, Take 1 capsule (10 mg) by mouth every 6 hours if needed (Abdominal pain)., Disp: 120 capsule, Rfl: 2    estradiol (Estrace) 0.01 % (0.1 mg/gram) vaginal cream, Insert pea size amount into vagina every night for 2 weeks, then 2x/week after, Disp: 42.5 g, Rfl: 12    gabapentin (Neurontin) 100 mg capsule, TAKE 3 CAPSULES THREE TIMES DAILY, Disp: 270 capsule, Rfl: 1    hydrOXYzine HCL (Atarax) 25 mg tablet, every 12 hours., Disp: , Rfl:     lidocaine 4 % cream, every 8 hours., Disp: , Rfl:     methenamine hippurate (Hiprex) 1 gram tablet, Take 1 tablet (1 g) by mouth 2 times a day., Disp: 60 tablet, Rfl: 11    minoxidiL 5 % solution, Apply 1 Application topically once daily., Disp: 60 mL, Rfl: 5    mirabegron (Myrbetriq) 50 mg tablet extended release 24 hr 24 hr  tablet, Take 1 tablet (50 mg) by mouth once daily., Disp: 90 tablet, Rfl: 3    mirabegron (Myrbetriq) 50 mg tablet extended release 24 hr 24 hr tablet, Take 1 tablet (50 mg) by mouth once daily., Disp: 90 tablet, Rfl: 3    Myrbetriq 50 mg tablet extended release 24 hr 24 hr tablet, once every 24 hours., Disp: , Rfl:     naloxone (Narcan) 4 mg/0.1 mL nasal spray, Nasally, Disp: , Rfl:     pantoprazole (ProtoNix) 40 mg EC tablet, Take 1 tablet (40 mg) by mouth once daily in the morning. Take before meals. Do not crush, chew, or split., Disp: 60 tablet, Rfl: 1    predniSONE (Deltasone) 10 mg tablet, Take 4 tabs by mouth daily for 5 days, then take 2 tabs by mouth daily for 5 days, then take 1 tab by mouth daily for 5 days with food., Disp: 35 tablet, Rfl: 0    SUMAtriptan (Imitrex) 50 mg tablet, Take 1 tablet (50 mg) by mouth 1 time if needed for migraine. May repeat after 2 hours., Disp: 27 tablet, Rfl: 0    triamcinolone (Kenalog) 0.025 % ointment, APPLY SPARINGLY TO AFFECTED AREA(S) 2 TO 3 TIMES DAILY., Disp: , Rfl:     VITAMIN B COMPLEX ORAL, Take 1 capsule by mouth once daily., Disp: , Rfl:      Objective   Well appearing patient in no apparent distress; mood and affect are within normal limits.    A focused skin examination was performed. All findings within normal limits unless otherwise noted below.    Assessment/Plan   1. Nonscarring hair loss  Scalp  Mild generalized scale, no erythema, hair pull positive. Complains of pruritus on occasion, loss started 2 months ago after COVID diagnosis    - Discussed the differential with patient and her mother; telogen effluvium most likely. Will rule out anemia or thyroid dysfunction.   Telogen effluvium  This condition is a type of alopecia, or hair loss. This condition causes the hair on the scalp to shed more than normal. This excessive shedding can be triggered by:   - Serious illness   - High fever   - Medication or drug use   - Fad diet (usually low protein)    -  Pregnancy / childbirth   - Heavy blood loss   - Significant emotional or physical stress   - Thyroid disease  The loss of hair usually begins a few months after the event that causes it. Telogen effluvium does not result in baldness. The hair will eventually regrow, usually within 6 months.  - Reassurance provided.    - Advised to start Rogaine 5% once daily.   - Labs ordered; TSH, CBC, CMP, vitD. Will call with results.   - Risks, benefits, and side effects discussed. Patient understood and agrees with the plan.       Related Procedures  CBC and Auto Differential  Comprehensive Metabolic Panel  TSH w/Reflex To Free T4 If Abnormal    Related Medications  minoxidiL 5 % solution  Apply 1 Application topically once daily.    2. Encounter for screening for malignant neoplasm of skin  Waist up on the body  Scattered benign lesions    - Protective measures, such as avoiding skin exposure to sunlight during peak sun hours (10 AM to 3 PM), wearing protective clothing, and applying high-SPF sunscreen, are essential for reducing exposure to harmful ultraviolet (UV) light.  - Monthly self-examination of the skin is helpful to detect new lesions or changes in existing lesions.  - Discussed signs and symptoms of sun-related skin cancers.   - Make sure your moles are not signs of skin cancer (melanoma). Remember the ABCDEs of melanoma lesions:  A - Asymmetry: One half of the lesion does not mirror the other half.  B - Border: The borders are irregular or vague (indistinct).  C - Color: More than one color may be noted within the mole.  D - Diameter: Size greater than 6 mm (roughly the size of a pencil eraser) may be concerning.  E - Evolving: Notable changes in the lesion over time are suspicious signs for skin cancer.    3. Lentigo  Waist up on the body  Scattered tan macules in sun-exposed areas.    A solar lentigo (plural, solar lentigines), sometimes called an age spot or liver spot, is a brown macule (small, flat, smooth  area of skin) caused by chronic sun or artificial ultraviolet (UV) light exposure. There may be just one lentigo or there may be multiple. This type of lentigo is different from lentigo simplex (discussed separately) because it is caused by exposure to UV light. Solar lentigines are benign, but they do indicate excessive sun exposure, a risk factor for the development of skin cancer.  Lesions are benign, no treatment needed.     4. Seborrheic keratosis  Waist up on the body  Stuck on verrucous, tan-brown papules and plaques.      Although Seborrheic Keratoses can be troublesome and unsightly, they are entirely benign.  Removal of Seborrheic Keratoses is considered a cosmetic procedure. Removal is typically performed using liquid nitrogen cryotherapy.  Treatment of current lesions does not prevent the development of new Seborrheic Keratoses in the future.

## 2025-01-13 ENCOUNTER — LAB (OUTPATIENT)
Dept: LAB | Facility: LAB | Age: 55
End: 2025-01-13
Payer: MEDICARE

## 2025-01-13 ENCOUNTER — APPOINTMENT (OUTPATIENT)
Dept: PRIMARY CARE | Facility: CLINIC | Age: 55
End: 2025-01-13
Payer: MEDICARE

## 2025-01-13 VITALS
WEIGHT: 148 LBS | HEART RATE: 84 BPM | SYSTOLIC BLOOD PRESSURE: 140 MMHG | DIASTOLIC BLOOD PRESSURE: 82 MMHG | OXYGEN SATURATION: 97 % | BODY MASS INDEX: 26.22 KG/M2 | HEIGHT: 63 IN

## 2025-01-13 DIAGNOSIS — I10 PRIMARY HYPERTENSION: ICD-10-CM

## 2025-01-13 DIAGNOSIS — R30.0 DYSURIA: ICD-10-CM

## 2025-01-13 DIAGNOSIS — G83.10 PARESIS OF SINGLE LOWER EXTREMITY (MULTI): ICD-10-CM

## 2025-01-13 DIAGNOSIS — K86.9 DISORDER OF PANCREATIC DUCT (HHS-HCC): ICD-10-CM

## 2025-01-13 DIAGNOSIS — N28.9 FUNCTION KIDNEY DECREASED: ICD-10-CM

## 2025-01-13 DIAGNOSIS — K86.1 OTHER CHRONIC PANCREATITIS: ICD-10-CM

## 2025-01-13 DIAGNOSIS — E78.5 DYSLIPIDEMIA: ICD-10-CM

## 2025-01-13 DIAGNOSIS — L65.9 NONSCARRING HAIR LOSS: ICD-10-CM

## 2025-01-13 DIAGNOSIS — G95.9 CERVICAL MYELOPATHY: ICD-10-CM

## 2025-01-13 DIAGNOSIS — Z00.00 HEALTH CARE MAINTENANCE: Primary | ICD-10-CM

## 2025-01-13 DIAGNOSIS — G43.019 INTRACTABLE MIGRAINE WITHOUT AURA AND WITHOUT STATUS MIGRAINOSUS: ICD-10-CM

## 2025-01-13 DIAGNOSIS — K21.9 GASTROESOPHAGEAL REFLUX DISEASE WITHOUT ESOPHAGITIS: ICD-10-CM

## 2025-01-13 DIAGNOSIS — F10.21 ALCOHOL DEPENDENCE, IN REMISSION: ICD-10-CM

## 2025-01-13 DIAGNOSIS — E55.9 VITAMIN D DEFICIENCY: ICD-10-CM

## 2025-01-13 DIAGNOSIS — I73.9 PERIPHERAL VASCULAR DISEASE, UNSPECIFIED (CMS-HCC): ICD-10-CM

## 2025-01-13 DIAGNOSIS — R73.01 ABNORMAL FASTING GLUCOSE: ICD-10-CM

## 2025-01-13 DIAGNOSIS — C79.82 SECONDARY MALIGNANT NEOPLASM OF GENITAL ORGANS (MULTI): ICD-10-CM

## 2025-01-13 DIAGNOSIS — G56.02 CARPAL TUNNEL SYNDROME OF LEFT WRIST: ICD-10-CM

## 2025-01-13 DIAGNOSIS — Z13.29 SCREENING FOR THYROID DISORDER: ICD-10-CM

## 2025-01-13 LAB
ALBUMIN SERPL BCP-MCNC: 4.6 G/DL (ref 3.4–5)
ALP SERPL-CCNC: 70 U/L (ref 33–110)
ALT SERPL W P-5'-P-CCNC: 19 U/L (ref 7–45)
ANION GAP SERPL CALC-SCNC: 13 MMOL/L (ref 10–20)
AST SERPL W P-5'-P-CCNC: 20 U/L (ref 9–39)
BASOPHILS # BLD AUTO: 0.06 X10*3/UL (ref 0–0.1)
BASOPHILS NFR BLD AUTO: 1.1 %
BILIRUB SERPL-MCNC: 0.7 MG/DL (ref 0–1.2)
BUN SERPL-MCNC: 11 MG/DL (ref 6–23)
CALCIUM SERPL-MCNC: 10.1 MG/DL (ref 8.6–10.3)
CHLORIDE SERPL-SCNC: 104 MMOL/L (ref 98–107)
CO2 SERPL-SCNC: 26 MMOL/L (ref 21–32)
CREAT SERPL-MCNC: 1.18 MG/DL (ref 0.5–1.05)
EGFRCR SERPLBLD CKD-EPI 2021: 54 ML/MIN/1.73M*2
EOSINOPHIL # BLD AUTO: 0.31 X10*3/UL (ref 0–0.7)
EOSINOPHIL NFR BLD AUTO: 5.7 %
ERYTHROCYTE [DISTWIDTH] IN BLOOD BY AUTOMATED COUNT: 13 % (ref 11.5–14.5)
GLUCOSE SERPL-MCNC: 111 MG/DL (ref 74–99)
HCT VFR BLD AUTO: 38.3 % (ref 36–46)
HGB BLD-MCNC: 12.9 G/DL (ref 12–16)
IMM GRANULOCYTES # BLD AUTO: 0.01 X10*3/UL (ref 0–0.7)
IMM GRANULOCYTES NFR BLD AUTO: 0.2 % (ref 0–0.9)
LYMPHOCYTES # BLD AUTO: 1.58 X10*3/UL (ref 1.2–4.8)
LYMPHOCYTES NFR BLD AUTO: 29 %
MCH RBC QN AUTO: 30.6 PG (ref 26–34)
MCHC RBC AUTO-ENTMCNC: 33.7 G/DL (ref 32–36)
MCV RBC AUTO: 91 FL (ref 80–100)
MONOCYTES # BLD AUTO: 0.61 X10*3/UL (ref 0.1–1)
MONOCYTES NFR BLD AUTO: 11.2 %
NEUTROPHILS # BLD AUTO: 2.87 X10*3/UL (ref 1.2–7.7)
NEUTROPHILS NFR BLD AUTO: 52.8 %
NRBC BLD-RTO: 0 /100 WBCS (ref 0–0)
PLATELET # BLD AUTO: 400 X10*3/UL (ref 150–450)
POC APPEARANCE, URINE: CLEAR
POC BILIRUBIN, URINE: ABNORMAL
POC BLOOD, URINE: NEGATIVE
POC COLOR, URINE: ABNORMAL
POC GLUCOSE, URINE: NEGATIVE MG/DL
POC KETONES, URINE: ABNORMAL MG/DL
POC LEUKOCYTES, URINE: NEGATIVE
POC NITRITE,URINE: NEGATIVE
POC PH, URINE: 5.5 PH
POC PROTEIN, URINE: ABNORMAL MG/DL
POC SPECIFIC GRAVITY, URINE: 1.02
POC UROBILINOGEN, URINE: 0.2 EU/DL
POTASSIUM SERPL-SCNC: 4.2 MMOL/L (ref 3.5–5.3)
PROT SERPL-MCNC: 7.1 G/DL (ref 6.4–8.2)
RBC # BLD AUTO: 4.22 X10*6/UL (ref 4–5.2)
SODIUM SERPL-SCNC: 139 MMOL/L (ref 136–145)
TSH SERPL-ACNC: 3.73 MIU/L (ref 0.44–3.98)
WBC # BLD AUTO: 5.4 X10*3/UL (ref 4.4–11.3)

## 2025-01-13 PROCEDURE — 84443 ASSAY THYROID STIM HORMONE: CPT

## 2025-01-13 PROCEDURE — 85025 COMPLETE CBC W/AUTO DIFF WBC: CPT

## 2025-01-13 PROCEDURE — 3008F BODY MASS INDEX DOCD: CPT

## 2025-01-13 PROCEDURE — 80053 COMPREHEN METABOLIC PANEL: CPT

## 2025-01-13 PROCEDURE — 3077F SYST BP >= 140 MM HG: CPT

## 2025-01-13 PROCEDURE — 1036F TOBACCO NON-USER: CPT

## 2025-01-13 PROCEDURE — 99214 OFFICE O/P EST MOD 30 MIN: CPT

## 2025-01-13 PROCEDURE — 3079F DIAST BP 80-89 MM HG: CPT

## 2025-01-13 PROCEDURE — G2211 COMPLEX E/M VISIT ADD ON: HCPCS

## 2025-01-13 PROCEDURE — 81003 URINALYSIS AUTO W/O SCOPE: CPT

## 2025-01-13 RX ORDER — SUMATRIPTAN SUCCINATE 50 MG/1
50 TABLET ORAL ONCE AS NEEDED
Qty: 27 TABLET | Refills: 0 | Status: SHIPPED | OUTPATIENT
Start: 2025-01-13 | End: 2025-01-13 | Stop reason: SDUPTHER

## 2025-01-13 RX ORDER — ATORVASTATIN CALCIUM 40 MG/1
40 TABLET, FILM COATED ORAL NIGHTLY
Qty: 90 TABLET | Refills: 3 | Status: SHIPPED | OUTPATIENT
Start: 2025-01-13

## 2025-01-13 RX ORDER — GABAPENTIN 100 MG/1
200 CAPSULE ORAL 2 TIMES DAILY
Qty: 360 CAPSULE | Refills: 3 | Status: SHIPPED | OUTPATIENT
Start: 2025-01-13 | End: 2026-01-13

## 2025-01-13 RX ORDER — SUMATRIPTAN SUCCINATE 50 MG/1
50 TABLET ORAL ONCE AS NEEDED
Qty: 27 TABLET | Refills: 3 | Status: SHIPPED | OUTPATIENT
Start: 2025-01-13 | End: 2026-01-13

## 2025-01-13 RX ORDER — ATORVASTATIN CALCIUM 40 MG/1
40 TABLET, FILM COATED ORAL NIGHTLY
Qty: 90 TABLET | Refills: 3 | Status: SHIPPED | OUTPATIENT
Start: 2025-01-13 | End: 2025-01-13 | Stop reason: SDUPTHER

## 2025-01-13 ASSESSMENT — ENCOUNTER SYMPTOMS
EYES NEGATIVE: 1
FLANK PAIN: 1
GASTROINTESTINAL NEGATIVE: 1
ALLERGIC/IMMUNOLOGIC NEGATIVE: 1
HEMATOLOGIC/LYMPHATIC NEGATIVE: 1
LOSS OF SENSATION IN FEET: 0
RESPIRATORY NEGATIVE: 1
CONSTITUTIONAL NEGATIVE: 1
PSYCHIATRIC NEGATIVE: 1
FREQUENCY: 1
OCCASIONAL FEELINGS OF UNSTEADINESS: 0
DEPRESSION: 0
CARDIOVASCULAR NEGATIVE: 1
DYSURIA: 1
NEUROLOGICAL NEGATIVE: 1
ENDOCRINE NEGATIVE: 1

## 2025-01-13 NOTE — PROGRESS NOTES
"Subjective   Patient ID: Mayela Carson is a 54 y.o. female who presents for Follow-up (Follow up, no concerns.).    Past Medical, Surgical, and Family History reviewed and updated in chart.     Reviewed all medications by prescribing practitioner or clinical pharmacist (such as prescriptions, OTCs, herbal therapies and supplements) and documented in the medical record.    HPI   54 yof in office for follow up.     Frequency, burning with urination with tingling sensation through abdomen. Left flank pain, severe.   Dark yellow this weekend. Denies blood in urine.    Review of Systems   Constitutional: Negative.    HENT: Negative.     Eyes: Negative.    Respiratory: Negative.     Cardiovascular: Negative.    Gastrointestinal: Negative.    Endocrine: Negative.    Genitourinary:  Positive for dysuria, flank pain and frequency.   Skin: Negative.    Allergic/Immunologic: Negative.    Neurological: Negative.    Hematological: Negative.    Psychiatric/Behavioral: Negative.     All other systems reviewed and are negative.      Objective   /82   Pulse 84   Ht 1.6 m (5' 2.99\")   Wt 67.1 kg (148 lb)   SpO2 97%   BMI 26.22 kg/m²     Physical Exam  Constitutional:       Appearance: Normal appearance.   HENT:      Head: Normocephalic and atraumatic.   Cardiovascular:      Rate and Rhythm: Normal rate and regular rhythm.      Pulses: Normal pulses.      Heart sounds: Normal heart sounds.   Pulmonary:      Effort: Pulmonary effort is normal.      Breath sounds: Normal breath sounds.   Skin:     General: Skin is warm and dry.   Neurological:      General: No focal deficit present.      Mental Status: She is alert and oriented to person, place, and time.   Psychiatric:         Mood and Affect: Mood normal.         Behavior: Behavior normal.         Thought Content: Thought content normal.         Judgment: Judgment normal.         Assessment/Plan   Problem List Items Addressed This Visit       Carpal tunnel syndrome of left " wrist    Relevant Medications    gabapentin (Neurontin) 100 mg capsule    Chronic pancreatitis (Multi)     Condition is stable. No concerns of at this time. CT from 10/2023 showed The pancreas appears unremarkable without evidence of ductal dilatation or masses.         Disorder of pancreatic duct ([facility])     CT from 10/2023 showed The pancreas appears unremarkable without evidence of ductal dilatation or masses.         Dyslipidemia    Relevant Orders    Lipid Panel    GERD (gastroesophageal reflux disease)     On protonix daily. Denies any indigestion.          Alcohol dependence, in remission    Cervical myelopathy    Vitamin D deficiency    HTN (hypertension)     BP in /82, will continue to monitor         Relevant Orders    CBC and Auto Differential    Peripheral vascular disease, unspecified (CMS-HCC)     Assessed and stable. No leg swelling, no complaints of pain.          RESOLVED: Secondary malignant neoplasm of genital organs (Multi)    Paresis of single lower extremity (Multi)     Other Visit Diagnoses       Health care maintenance    -  Primary    Relevant Orders    Follow Up In [facility] - PCP - Health Maintenance    CBC and Auto Differential    Lipid Panel    Hemoglobin A1C    Basic Metabolic Panel    TSH with reflex to Free T4 if abnormal    Albumin-Creatinine Ratio, Urine Random    Dysuria        Relevant Orders    POCT UA Automated manually resulted (Completed)    Intractable migraine without aura and without status migrainosus        Abnormal fasting glucose        Relevant Orders    Hemoglobin A1C    Albumin-Creatinine Ratio, Urine Random    Function kidney decreased        Screening for thyroid disorder        Relevant Orders    TSH with reflex to Free T4 if abnormal             Urine dip was negative for any bacteria or blood. I recommend continuing with the AZO as recommend by urology as needed.

## 2025-01-13 NOTE — ASSESSMENT & PLAN NOTE
Condition is stable. No concerns of at this time. CT from 10/2023 showed The pancreas appears unremarkable without evidence of ductal dilatation or masses.

## 2025-01-13 NOTE — PATIENT INSTRUCTIONS
I am ordering labs for you to get when you are fasting (meaning nothing to eat or drink for at least 12 hours before, water and black coffee are okay). Prior to follow up visit.     It is recommend that you consume a balanced diet to include: fruits, a variety of vegetables (dark green, red and orange, legumes like beans and peas, starch, other), grains (at least half of which are whole grains), fat-free or low-fat dairy including milk,cheese, or fortified soy beverages, and proteins such as lean means, poultry, fish, eggs, nuts, seeds.   Limit processed foods, saturated and trans fats, added sugars and sodium (salt).     It is recommended that you get at least 150min exercise every week. More is even better. Moderate activities are activities that get your heart beating faster but you are still able to carry on a conversation. Vigorous activities will have you take breaths after a few words. You should also include two days of muscle strengthening activities to include all major muscle groups (arms, shoulders, chest, abdomen, back, hips and legs)    Benefits of keeping active include:  Lowering your risk of developing type II diabetes and some cancers  Control your blood pressure  Maintain a healthy weight  Improving mood and managing stress  Improving sleep    Assessment/Plan   Problem List Items Addressed This Visit       Carpal tunnel syndrome of left wrist    Relevant Medications    gabapentin (Neurontin) 100 mg capsule    Chronic pancreatitis (Multi)     Condition is stable. No concerns of at this time.          Dyslipidemia    Relevant Orders    Lipid Panel    Alcohol dependence, in remission    Cervical myelopathy    Vitamin D deficiency    HTN (hypertension)    Relevant Orders    CBC and Auto Differential    RESOLVED: Secondary malignant neoplasm of genital organs (Multi)    Paresis of single lower extremity (Multi)     Other Visit Diagnoses       Health care maintenance    -  Primary    Relevant Orders     Follow Up In [facility] - PCP - Health Maintenance    CBC and Auto Differential    Lipid Panel    Hemoglobin A1C    Basic Metabolic Panel    TSH with reflex to Free T4 if abnormal    Albumin-Creatinine Ratio, Urine Random    Dysuria        Relevant Orders    POCT UA Automated manually resulted (Completed)    Intractable migraine without aura and without status migrainosus        Abnormal fasting glucose        Relevant Orders    Hemoglobin A1C    Albumin-Creatinine Ratio, Urine Random    Function kidney decreased        Screening for thyroid disorder        Relevant Orders    TSH with reflex to Free T4 if abnormal

## 2025-01-16 ENCOUNTER — TELEPHONE (OUTPATIENT)
Dept: DERMATOLOGY | Facility: CLINIC | Age: 55
End: 2025-01-16
Payer: MEDICARE

## 2025-02-25 ENCOUNTER — APPOINTMENT (OUTPATIENT)
Dept: UROLOGY | Facility: CLINIC | Age: 55
End: 2025-02-25
Payer: MEDICARE

## 2025-02-25 DIAGNOSIS — N39.0 RECURRENT UTI: ICD-10-CM

## 2025-02-25 DIAGNOSIS — N32.81 OAB (OVERACTIVE BLADDER): Primary | ICD-10-CM

## 2025-02-25 RX ORDER — MIRABEGRON 50 MG/1
50 TABLET, FILM COATED, EXTENDED RELEASE ORAL DAILY
Qty: 90 TABLET | Refills: 3 | Status: SHIPPED | OUTPATIENT
Start: 2025-02-25 | End: 2026-02-25

## 2025-02-25 RX ORDER — ESTRADIOL 0.1 MG/G
CREAM VAGINAL
Qty: 42.5 G | Refills: 12 | Status: SHIPPED | OUTPATIENT
Start: 2025-02-25

## 2025-02-25 NOTE — PROGRESS NOTES
Virtual or Telephone Consent    While technically available, the patient was unable or unwilling to consent to connect via audio/video telehealth technology; therefore, I performed this visit using a real-time audio only connection between Mayela Carson location: home & Ashok Rogel MD location: office.  Verbal consent was requested and obtained from Mayela Carson on this date, 02/25/25 for a telehealth visit.     HISTORY OF PRESENT ILLNESS:  Mayela Carson is a 54 y.o. female who presents today for a virtual follow up visit. She is doing well. She is not on methenamine.           Past Medical History  She has a past medical history of Actinic keratosis (08/30/2017), Anxiety disorder, unspecified, Cancer involving uterine cervix by direct extension from vagina (Multi) (04/26/2023), History of alcoholism (Multi) (04/26/2023), History of cervical cancer (10/15/2010), Itchy scalp (11/14/2023), Nonallopathic lesion of thoracolumbar region (01/10/2011), Otalgia, right (10/15/2020), Other seborrheic keratosis (08/30/2017), Pain (01/10/2011), Pain in extremity (11/14/2023), Personal history of malignant neoplasm, unspecified, Personal history of other diseases of the musculoskeletal system and connective tissue (11/20/2019), Rib pain on right side (11/14/2023), Right flank pain (11/14/2023), Scalp psoriasis (11/14/2023), Skin changes due to chronic exposure to nonionizing radiation, unspecified (08/30/2017), Urinary urgency (11/14/2023), UTI (urinary tract infection) (11/14/2023), and Vulvar rash (11/14/2023).    Surgical History  She has a past surgical history that includes Other surgical history (09/14/2016) and Hysterectomy (09/14/2016).     Social History  She reports that she has quit smoking. Her smoking use included cigarettes. She has never used smokeless tobacco. She reports current drug use. Drug: Marijuana. She reports that she does not drink alcohol.    Family History  No family history on file.      Allergies  Buprenorphine and Pregabalin      A comprehensive 10+ review of systems was negative except for: see hpi                  Assessment:  54-year-old with mixed urinary incontinence in the setting of possible demyelinating disease and hypertonic pelvic floor     [place]:   Rx Myrbetriq, doing well   Continues to have JAYSHREE, positive UDS, wants to have sling  Was scheduled to have a sling, wants to hold off on this for now        [place] :  continue estradiol cream  continue methenamine   Standing urine culture   Had completed 90 days of Macrobid in the past  Urine culture ordered/sent out 8/27/24, if positive will restart abx         CPP:  -May be contributing at least partially to her urinary symptoms  -has completed PT  Somewhat improved      Follow up in 1 year       I spent a total of 15 minutes speaking with the patient on the telephone     All questions and concerns were answered and addressed.  The patient expressed understanding and agrees with the plan.     Ashok Rogel MD    Scribe Attestation  By signing my name below, IShelley, Scribe   attest that this documentation has been prepared under the direction and in the presence of Ashok Rogel MD.

## 2025-02-26 DIAGNOSIS — N39.0 RECURRENT UTI: ICD-10-CM

## 2025-02-27 DIAGNOSIS — N39.0 RECURRENT UTI: ICD-10-CM

## 2025-02-28 DIAGNOSIS — N39.0 RECURRENT UTI: ICD-10-CM

## 2025-03-01 DIAGNOSIS — N39.0 RECURRENT UTI: ICD-10-CM

## 2025-03-02 DIAGNOSIS — N39.0 RECURRENT UTI: ICD-10-CM

## 2025-03-03 DIAGNOSIS — R11.2 NAUSEA AND VOMITING IN ADULT: Primary | ICD-10-CM

## 2025-03-03 DIAGNOSIS — N39.0 RECURRENT UTI: ICD-10-CM

## 2025-03-03 RX ORDER — PROMETHAZINE HYDROCHLORIDE 12.5 MG/1
12.5 TABLET ORAL EVERY 6 HOURS PRN
COMMUNITY
End: 2025-03-03 | Stop reason: SDUPTHER

## 2025-03-04 DIAGNOSIS — N39.0 RECURRENT UTI: ICD-10-CM

## 2025-03-05 DIAGNOSIS — N39.0 RECURRENT UTI: ICD-10-CM

## 2025-03-06 DIAGNOSIS — N39.0 RECURRENT UTI: ICD-10-CM

## 2025-03-07 DIAGNOSIS — N39.0 RECURRENT UTI: ICD-10-CM

## 2025-03-08 DIAGNOSIS — E78.5 DYSLIPIDEMIA: ICD-10-CM

## 2025-03-08 DIAGNOSIS — N39.0 RECURRENT UTI: ICD-10-CM

## 2025-03-08 DIAGNOSIS — G43.019 INTRACTABLE MIGRAINE WITHOUT AURA AND WITHOUT STATUS MIGRAINOSUS: ICD-10-CM

## 2025-03-11 RX ORDER — PROMETHAZINE HYDROCHLORIDE 12.5 MG/1
12.5 TABLET ORAL EVERY 6 HOURS PRN
Qty: 90 TABLET | Refills: 0 | Status: SHIPPED | OUTPATIENT
Start: 2025-03-11

## 2025-03-11 RX ORDER — ATORVASTATIN CALCIUM 40 MG/1
40 TABLET, FILM COATED ORAL DAILY
Qty: 90 TABLET | Refills: 1 | Status: SHIPPED | OUTPATIENT
Start: 2025-03-11

## 2025-03-11 RX ORDER — SUMATRIPTAN SUCCINATE 50 MG/1
50 TABLET ORAL ONCE AS NEEDED
Qty: 30 TABLET | Refills: 0 | Status: SHIPPED | OUTPATIENT
Start: 2025-03-11

## 2025-03-22 DIAGNOSIS — K21.9 GASTROESOPHAGEAL REFLUX DISEASE, UNSPECIFIED WHETHER ESOPHAGITIS PRESENT: ICD-10-CM

## 2025-03-24 RX ORDER — PANTOPRAZOLE SODIUM 40 MG/1
TABLET, DELAYED RELEASE ORAL
Qty: 90 TABLET | Refills: 3 | Status: SHIPPED | OUTPATIENT
Start: 2025-03-24

## 2025-04-07 DIAGNOSIS — G43.019 INTRACTABLE MIGRAINE WITHOUT AURA AND WITHOUT STATUS MIGRAINOSUS: ICD-10-CM

## 2025-04-07 RX ORDER — SUMATRIPTAN SUCCINATE 50 MG/1
50 TABLET ORAL ONCE AS NEEDED
Qty: 27 TABLET | Refills: 3 | Status: SHIPPED | OUTPATIENT
Start: 2025-04-07

## 2025-04-14 ENCOUNTER — HOSPITAL ENCOUNTER (OUTPATIENT)
Dept: RADIOLOGY | Facility: HOSPITAL | Age: 55
Discharge: HOME | End: 2025-04-14
Payer: MEDICARE

## 2025-04-14 VITALS — HEIGHT: 62 IN | BODY MASS INDEX: 27.23 KG/M2 | WEIGHT: 148 LBS

## 2025-04-14 DIAGNOSIS — Z12.31 ENCOUNTER FOR SCREENING MAMMOGRAM FOR MALIGNANT NEOPLASM OF BREAST: ICD-10-CM

## 2025-04-14 PROCEDURE — 77063 BREAST TOMOSYNTHESIS BI: CPT | Performed by: RADIOLOGY

## 2025-04-14 PROCEDURE — 77067 SCR MAMMO BI INCL CAD: CPT | Performed by: RADIOLOGY

## 2025-04-14 PROCEDURE — 77067 SCR MAMMO BI INCL CAD: CPT

## 2025-04-21 DIAGNOSIS — R10.11 RUQ PAIN: Primary | ICD-10-CM

## 2025-04-21 RX ORDER — DICYCLOMINE HYDROCHLORIDE 10 MG/1
CAPSULE ORAL
Qty: 120 CAPSULE | Refills: 1 | Status: SHIPPED | OUTPATIENT
Start: 2025-04-21

## 2025-05-14 ENCOUNTER — APPOINTMENT (OUTPATIENT)
Dept: OBSTETRICS AND GYNECOLOGY | Facility: CLINIC | Age: 55
End: 2025-05-14
Payer: MEDICARE

## 2025-06-07 DIAGNOSIS — R10.11 RUQ PAIN: ICD-10-CM

## 2025-06-09 RX ORDER — DICYCLOMINE HYDROCHLORIDE 10 MG/1
CAPSULE ORAL
Qty: 120 CAPSULE | Refills: 6 | Status: SHIPPED | OUTPATIENT
Start: 2025-06-09

## 2025-06-11 ENCOUNTER — APPOINTMENT (OUTPATIENT)
Dept: OBSTETRICS AND GYNECOLOGY | Facility: CLINIC | Age: 55
End: 2025-06-11
Payer: MEDICARE

## 2025-06-11 VITALS — HEIGHT: 63 IN | BODY MASS INDEX: 24.63 KG/M2 | WEIGHT: 139 LBS

## 2025-06-11 DIAGNOSIS — Z85.41 HISTORY OF CERVICAL CANCER: ICD-10-CM

## 2025-06-11 DIAGNOSIS — R30.0 DYSURIA: ICD-10-CM

## 2025-06-11 DIAGNOSIS — Z01.419 WOMEN'S ANNUAL ROUTINE GYNECOLOGICAL EXAMINATION: Primary | ICD-10-CM

## 2025-06-11 DIAGNOSIS — N95.1 VASOMOTOR SYMPTOMS DUE TO MENOPAUSE: ICD-10-CM

## 2025-06-11 PROCEDURE — 87626 HPV SEP HI-RSK TYP&POOL RSLT: CPT | Performed by: ADVANCED PRACTICE MIDWIFE

## 2025-06-11 PROCEDURE — 99386 PREV VISIT NEW AGE 40-64: CPT | Performed by: ADVANCED PRACTICE MIDWIFE

## 2025-06-11 PROCEDURE — 88175 CYTOPATH C/V AUTO FLUID REDO: CPT | Mod: TC,GCY | Performed by: ADVANCED PRACTICE MIDWIFE

## 2025-06-11 PROCEDURE — 3008F BODY MASS INDEX DOCD: CPT | Performed by: ADVANCED PRACTICE MIDWIFE

## 2025-06-11 PROCEDURE — 1036F TOBACCO NON-USER: CPT | Performed by: ADVANCED PRACTICE MIDWIFE

## 2025-06-11 RX ORDER — PAROXETINE HYDROCHLORIDE HEMIHYDRATE 12.5 MG/1
12.5 TABLET, FILM COATED, EXTENDED RELEASE ORAL EVERY MORNING
Qty: 30 TABLET | Refills: 11 | Status: SHIPPED | OUTPATIENT
Start: 2025-06-11 | End: 2026-06-11

## 2025-06-11 SDOH — ECONOMIC STABILITY: TRANSPORTATION INSECURITY
IN THE PAST 12 MONTHS, HAS LACK OF TRANSPORTATION KEPT YOU FROM MEETINGS, WORK, OR FROM GETTING THINGS NEEDED FOR DAILY LIVING?: NO

## 2025-06-11 SDOH — ECONOMIC STABILITY: TRANSPORTATION INSECURITY
IN THE PAST 12 MONTHS, HAS THE LACK OF TRANSPORTATION KEPT YOU FROM MEDICAL APPOINTMENTS OR FROM GETTING MEDICATIONS?: NO

## 2025-06-11 ASSESSMENT — PAIN SCALES - GENERAL: PAINLEVEL_OUTOF10: 4

## 2025-06-11 NOTE — PROGRESS NOTES
Assessment/Plan   Diagnoses and all orders for this visit:  Women's annual routine gynecological examination  History of cervical cancer  -     THINPREP PAP TEST (>30)      Va Gaytan, APRN-CNM     Subjective   Mayela Carson is a 55 y.o. female who presents for annual exam.     Reports urinary discomfort beginning 6 days ago. Has struggled with recurrent UTI in the past. Urine culture today.     C/O persistent vasomotor symptoms of menopause. We discussed alternatives to HRT for treatment. Pt is most interested in a trial of an SSRI. Script for Paxil sent. Will reassess in 4 week via virtual visit.     GYN screening history: Last pap  but pt had been having them yearly following hysterectomy for cervical cancer. Plan pap today. The patient is not taking hormone replacement therapy. Patient denies post-menopausal vaginal bleeding..  The patient participates in regular exercise: yes. The patient reports that there is not domestic violence in their life.     Sexual Activity: not sexually active    Last pap: 2019  History of abnormal Pap smear: yes - H/O cervical cancer   Family history of uterine or ovarian cancer: yes - mother with ovarian and uterine cancer    Last mammogram: 2025  History of abnormal mammogram: no  Family history of breast cancer: no  Menstrual History:  OB History          2    Para   2    Term   2            AB        Living   2         SAB        IAB        Ectopic        Multiple        Live Births   2                  No LMP recorded. Patient is postmenopausal.       Objective   There were no vitals taken for this visit.  Physical Exam  Constitutional:       Appearance: Normal appearance.   Genitourinary:      No lesions in the vagina.      Right Labia: No rash, tenderness, lesions, skin changes or Bartholin's cyst.     Left Labia: No tenderness, lesions, skin changes, Bartholin's cyst or rash.     No vaginal discharge, erythema, bleeding or granulation tissue.      No  vaginal prolapse present.     Moderate vaginal atrophy present.       Right Adnexa: not tender, not full and no mass present.     Left Adnexa: not tender, not full and no mass present.     Cervix is absent.      Uterus is absent.   Breasts:     Breasts are soft.     Right: Normal.      Left: Normal.   Cardiovascular:      Rate and Rhythm: Normal rate and regular rhythm.   Pulmonary:      Effort: Pulmonary effort is normal.      Breath sounds: Normal breath sounds.   Abdominal:      General: Abdomen is flat. Bowel sounds are normal.      Palpations: Abdomen is soft.   Musculoskeletal:         General: Normal range of motion.      Cervical back: Normal range of motion.   Neurological:      General: No focal deficit present.      Mental Status: She is alert and oriented to person, place, and time. Mental status is at baseline.   Skin:     General: Skin is warm and dry.   Psychiatric:         Mood and Affect: Mood normal.         Behavior: Behavior normal.         Thought Content: Thought content normal.         Judgment: Judgment normal.          Annual in 1 year     Va Gaytan, APRN-CNM

## 2025-06-13 LAB — BACTERIA UR CULT: ABNORMAL

## 2025-06-16 DIAGNOSIS — B37.31 VAGINAL YEAST INFECTION: Primary | ICD-10-CM

## 2025-06-16 DIAGNOSIS — N39.0 URINARY TRACT INFECTION WITHOUT HEMATURIA, SITE UNSPECIFIED: Primary | ICD-10-CM

## 2025-06-16 RX ORDER — NITROFURANTOIN 25; 75 MG/1; MG/1
100 CAPSULE ORAL 2 TIMES DAILY
Qty: 14 CAPSULE | Refills: 0 | Status: SHIPPED | OUTPATIENT
Start: 2025-06-16 | End: 2025-06-23

## 2025-06-16 RX ORDER — FLUCONAZOLE 150 MG/1
150 TABLET ORAL ONCE
Qty: 1 TABLET | Refills: 0 | Status: SHIPPED | OUTPATIENT
Start: 2025-06-16 | End: 2025-06-16

## 2025-06-18 ENCOUNTER — TELEPHONE (OUTPATIENT)
Dept: OBSTETRICS AND GYNECOLOGY | Facility: HOSPITAL | Age: 55
End: 2025-06-18
Payer: MEDICARE

## 2025-06-18 RX ORDER — NITROFURANTOIN 25; 75 MG/1; MG/1
100 CAPSULE ORAL 2 TIMES DAILY
Qty: 14 CAPSULE | Refills: 0 | Status: CANCELLED | OUTPATIENT
Start: 2025-06-18 | End: 2025-06-25

## 2025-06-18 RX ORDER — FLUCONAZOLE 150 MG/1
150 TABLET ORAL ONCE
Qty: 1 TABLET | Refills: 0 | Status: CANCELLED | OUTPATIENT
Start: 2025-06-18 | End: 2025-06-18

## 2025-06-18 NOTE — PROGRESS NOTES
Patient states she's receiving prescription through mail order but rather have [facility] as her preferred pharmacy. Patient made aware that nurse changed that for her. Patient was very appreciative and had no further questions, comments or concerns at this time.    TRACI Lopez

## 2025-06-18 NOTE — TELEPHONE ENCOUNTER
Patient verified name and date of birth at start of call and stated that she's receiving prescription through mail order but rather have [facility] as her preferred pharmacy. Patient made aware that nurse changed that for her. Patient was very appreciative and had no further questions, comments or concerns at this time.    TRACI Lopez

## 2025-06-25 LAB
CYTOLOGY CMNT CVX/VAG CYTO-IMP: NORMAL
HPV HR 12 DNA GENITAL QL NAA+PROBE: NEGATIVE
HPV HR GENOTYPES PNL CVX NAA+PROBE: NEGATIVE
HPV16 DNA SPEC QL NAA+PROBE: NEGATIVE
HPV18 DNA SPEC QL NAA+PROBE: NEGATIVE
LAB AP HPV GENOTYPE QUESTION: YES
LAB AP HPV HR: NORMAL
LABORATORY COMMENT REPORT: NORMAL
PATH REPORT.TOTAL CANCER: NORMAL

## 2025-07-09 ENCOUNTER — TELEPHONE (OUTPATIENT)
Dept: UROLOGY | Facility: CLINIC | Age: 55
End: 2025-07-09

## 2025-07-09 ENCOUNTER — APPOINTMENT (OUTPATIENT)
Dept: DERMATOLOGY | Facility: CLINIC | Age: 55
End: 2025-07-09
Payer: MEDICARE

## 2025-07-09 DIAGNOSIS — L65.9 NONSCARRING HAIR LOSS: Primary | ICD-10-CM

## 2025-07-09 DIAGNOSIS — Z12.83 ENCOUNTER FOR SCREENING FOR MALIGNANT NEOPLASM OF SKIN: ICD-10-CM

## 2025-07-09 PROCEDURE — 1036F TOBACCO NON-USER: CPT | Performed by: NURSE PRACTITIONER

## 2025-07-09 PROCEDURE — 99213 OFFICE O/P EST LOW 20 MIN: CPT | Performed by: NURSE PRACTITIONER

## 2025-07-09 NOTE — PROGRESS NOTES
Subjective     Mayela Carson is a 55 y.o. female who presents for the following: Alopecia (6 month follow up: patient notes no improvement, but states hair loss seems stable. Not currently using any therpies. Accompanied by parent. ).          Review of Systems:  No other skin or systemic complaints other than what is documented elsewhere in the note.    The following portions of the chart were reviewed this encounter and updated as appropriate:  Tobacco  Allergies  Meds  Problems  Med Hx  Surg Hx  Fam Hx         Skin Cancer History  Biopsy Log Book  No skin cancers from Specimen Tracking.    Additional History      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 (Multi) (04/26/2023), History of alcoholism (Multi) (04/26/2023), History of cervical cancer (10/15/2010), Itchy scalp (11/14/2023), Nonallopathic lesion of thoracolumbar region (01/10/2011), Otalgia, right (10/15/2020), Other seborrheic keratosis (08/30/2017), Pain (01/10/2011), Pain in extremity (11/14/2023), Personal history of malignant neoplasm, unspecified, Personal history of other diseases of the musculoskeletal system and connective tissue (11/20/2019), Rib pain on right side (11/14/2023), Right flank pain (11/14/2023), Scalp psoriasis (11/14/2023), Skin changes due to chronic exposure to nonionizing radiation, unspecified (08/30/2017), Urinary urgency (11/14/2023), UTI (urinary tract infection) (11/14/2023), and Vulvar rash (11/14/2023).    Past Surgical History:  Mayela Carson  has a past surgical history that includes Other surgical history (09/14/2016); Hysterectomy (09/14/2016); and Augmentation mammaplasty (22).    Family History:  Patient family history is not on  file.    Social History:  Mayela Carson  reports that she has quit smoking. Her smoking use included cigarettes. She has never used smokeless tobacco. She reports current drug use. Drug: Marijuana. She reports that she does not drink alcohol.    Allergies:  Buprenorphine and Pregabalin    Current Medications / CAM's:  Current Medications[1]     Objective   Well appearing patient in no apparent distress; mood and affect are within normal limits.    A focused skin examination was performed. All findings within normal limits unless otherwise noted below.    Assessment/Plan   Skin Exam  1. NONSCARRING HAIR LOSS  Scalp  Scalp clear, no erythema, no scale  - Discussed the condition, likely due to new hair products. Once those products were discontinued, the condition cleared.   - Please call if the condition flares.     Related Medications  minoxidiL 5 % solution  Apply 1 Application topically once daily.  2. ENCOUNTER FOR SCREENING FOR MALIGNANT NEOPLASM OF SKIN  Torso - Posterior (Back)  Scattered benign lesions  - Protective measures, such as avoiding skin exposure to sunlight during peak sun hours (10 AM to 3 PM), wearing protective clothing, and applying high-SPF sunscreen, are essential for reducing exposure to harmful ultraviolet (UV) light.  - Monthly self-examination of the skin is helpful to detect new lesions or changes in existing lesions.  - Discussed signs and symptoms of sun-related skin cancers.   - Make sure your moles are not signs of skin cancer (melanoma). Remember the ABCDEs of melanoma lesions:  A - Asymmetry: One half of the lesion does not mirror the other half.  B - Border: The borders are irregular or vague (indistinct).  C - Color: More than one color may be noted within the mole.  D - Diameter: Size greater than 6 mm (roughly the size of a pencil eraser) may be concerning.  E - Evolving: Notable changes in the lesion over time are suspicious signs for skin cancer.         [1]   Current  Outpatient Medications:     acetaminophen (Tylenol Arthritis Pain) 650 mg ER tablet, every 8 hours., Disp: , Rfl:     aspirin 81 mg EC tablet, once every 24 hours., Disp: , Rfl:     atorvastatin (Lipitor) 40 mg tablet, Take 1 tablet (40 mg) by mouth once daily., Disp: 90 tablet, Rfl: 1    baclofen (Lioresal) 10 mg tablet, 1 tablet as needed Orally once at bedtime, may have one extra tab prn 5 days per month for 30 days, Disp: , Rfl:     Belbuca 75 mcg buccal film, every 8 hours., Disp: , Rfl:     cholecalciferol (Vitamin D-3) 125 MCG (5000 UT) capsule, 1 capsule (125 mcg) once every 24 hours., Disp: , Rfl:     clotrimazole-betamethasone (Lotrisone) cream, apply to vulva and surrounding skin 2x/day for 2 weeks, 1x/day for 2 weeks, every other day for 2 weeks, then 3x/week for 2 weeks, Disp: , Rfl:     dicyclomine (Bentyl) 10 mg capsule, TAKE 1 CAPSULE EVERY 6 HOURS AS NEEDED (ABDOMINAL PAIN), Disp: 120 capsule, Rfl: 6    estradiol (Estrace) 0.01 % (0.1 mg/gram) vaginal cream, Insert pea size amount into vagina every night for 2 weeks, then 2x/week after, Disp: 42.5 g, Rfl: 12    estradiol (Estrace) 0.01 % (0.1 mg/gram) vaginal cream, Insert pea size amount into vagina every night for 2 weeks, then 2x/week after, Disp: 42.5 g, Rfl: 12    gabapentin (Neurontin) 100 mg capsule, Take 2 capsules (200 mg) by mouth 2 times a day. TAKE 3 CAPSULES THREE TIMES DAILYTAKE 3 CAPSULES THREE TIMES DAILY, Disp: 360 capsule, Rfl: 3    hydrOXYzine HCL (Atarax) 25 mg tablet, every 12 hours., Disp: , Rfl:     lidocaine 4 % cream, every 8 hours., Disp: , Rfl:     minoxidiL 5 % solution, Apply 1 Application topically once daily., Disp: 60 mL, Rfl: 5    mirabegron (Myrbetriq) 50 mg tablet extended release 24 hr 24 hr tablet, Take 1 tablet (50 mg) by mouth once daily., Disp: 90 tablet, Rfl: 3    mirabegron (Myrbetriq) 50 mg tablet extended release 24 hr 24 hr tablet, Take 1 tablet (50 mg) by mouth once daily., Disp: 90 tablet, Rfl: 3     Myrbetriq 50 mg tablet extended release 24 hr 24 hr tablet, once every 24 hours., Disp: , Rfl:     naloxone (Narcan) 4 mg/0.1 mL nasal spray, Nasally, Disp: , Rfl:     pantoprazole (ProtoNix) 40 mg EC tablet, TAKE 1 TABLET EVERY MORNING BEFORE A MEAL (DO NOT CRUSH, CHEW, OR SPLIT) APPOINTMENT IS NEEDED, Disp: 90 tablet, Rfl: 3    PARoxetine CR (Paxil CR) 12.5 mg 24 hr tablet, Take 1 tablet (12.5 mg) by mouth once daily in the morning. Do not crush, chew, or split., Disp: 30 tablet, Rfl: 11    promethazine (Phenergan) 12.5 mg tablet, Take 1 tablet (12.5 mg) by mouth every 6 hours if needed for nausea or vomiting., Disp: 90 tablet, Rfl: 0    SUMAtriptan (Imitrex) 50 mg tablet, TAKE 1 TABLET (50 MG) BY MOUTH 1 TIME IF NEEDED FOR MIGRAINE FOR UP TO 30 DOSES., Disp: 27 tablet, Rfl: 3    triamcinolone (Kenalog) 0.025 % ointment, APPLY SPARINGLY TO AFFECTED AREA(S) 2 TO 3 TIMES DAILY., Disp: , Rfl:     VITAMIN B COMPLEX ORAL, Take 1 capsule by mouth once daily., Disp: , Rfl:

## 2025-07-09 NOTE — Clinical Note
- Discussed the condition, likely due to new hair products. Once those products were discontinued, the condition cleared.   - Please call if the condition flares.

## 2025-07-11 LAB
APPEARANCE UR: CLEAR
BACTERIA UR CULT: NORMAL
BILIRUB UR QL STRIP: NEGATIVE
COLOR UR: YELLOW
GLUCOSE UR QL STRIP: NEGATIVE
HGB UR QL STRIP: NEGATIVE
KETONES UR QL STRIP: NEGATIVE
LEUKOCYTE ESTERASE UR QL STRIP: NEGATIVE
NITRITE UR QL STRIP: NEGATIVE
PH UR STRIP: 6 [PH] (ref 5–8)
PROT UR QL STRIP: NEGATIVE
SP GR UR STRIP: 1.02 (ref 1–1.03)

## 2025-07-15 ENCOUNTER — APPOINTMENT (OUTPATIENT)
Dept: PRIMARY CARE | Facility: CLINIC | Age: 55
End: 2025-07-15
Payer: MEDICARE

## 2025-07-15 VITALS
DIASTOLIC BLOOD PRESSURE: 66 MMHG | SYSTOLIC BLOOD PRESSURE: 145 MMHG | WEIGHT: 139 LBS | HEIGHT: 63 IN | BODY MASS INDEX: 24.63 KG/M2 | OXYGEN SATURATION: 94 % | HEART RATE: 65 BPM

## 2025-07-15 DIAGNOSIS — R10.9 FLANK PAIN: Primary | ICD-10-CM

## 2025-07-15 DIAGNOSIS — G43.019 INTRACTABLE MIGRAINE WITHOUT AURA AND WITHOUT STATUS MIGRAINOSUS: ICD-10-CM

## 2025-07-15 DIAGNOSIS — M47.812 CS (CERVICAL SPONDYLOSIS): ICD-10-CM

## 2025-07-15 DIAGNOSIS — M54.6 THORACIC SPINE PAIN: ICD-10-CM

## 2025-07-15 DIAGNOSIS — G56.02 CARPAL TUNNEL SYNDROME OF LEFT WRIST: ICD-10-CM

## 2025-07-15 DIAGNOSIS — M47.816 LUMBAR SPONDYLOSIS: ICD-10-CM

## 2025-07-15 DIAGNOSIS — Z00.00 HEALTH CARE MAINTENANCE: ICD-10-CM

## 2025-07-15 DIAGNOSIS — I10 PRIMARY HYPERTENSION: ICD-10-CM

## 2025-07-15 DIAGNOSIS — G37.9 CNS DEMYELINATION (MULTI): ICD-10-CM

## 2025-07-15 DIAGNOSIS — N18.31 CHRONIC KIDNEY DISEASE, STAGE 3A (MULTI): ICD-10-CM

## 2025-07-15 DIAGNOSIS — K86.1 CHRONIC BILIARY PANCREATITIS (MULTI): ICD-10-CM

## 2025-07-15 DIAGNOSIS — M99.01 CERVICAL SOMATIC DYSFUNCTION: ICD-10-CM

## 2025-07-15 DIAGNOSIS — E78.5 DYSLIPIDEMIA: ICD-10-CM

## 2025-07-15 DIAGNOSIS — Z00.00 MEDICARE ANNUAL WELLNESS VISIT, SUBSEQUENT: ICD-10-CM

## 2025-07-15 DIAGNOSIS — G95.9 CERVICAL MYELOPATHY: ICD-10-CM

## 2025-07-15 DIAGNOSIS — G70.9 MYONEURAL DISORDER, UNSPECIFIED: ICD-10-CM

## 2025-07-15 DIAGNOSIS — K21.9 GASTROESOPHAGEAL REFLUX DISEASE, UNSPECIFIED WHETHER ESOPHAGITIS PRESENT: ICD-10-CM

## 2025-07-15 PROBLEM — K29.00 ACUTE SUPERFICIAL GASTRITIS: Status: RESOLVED | Noted: 2023-04-26 | Resolved: 2025-07-15

## 2025-07-15 PROBLEM — M62.838 MUSCLE SPASM: Status: RESOLVED | Noted: 2023-04-26 | Resolved: 2025-07-15

## 2025-07-15 PROBLEM — M24.159 LABRAL TEAR OF HIP, DEGENERATIVE: Status: RESOLVED | Noted: 2023-04-26 | Resolved: 2025-07-15

## 2025-07-15 PROBLEM — M25.511 RIGHT SHOULDER PAIN: Status: RESOLVED | Noted: 2023-04-26 | Resolved: 2025-07-15

## 2025-07-15 PROBLEM — M51.369 OTHER INTERVERTEBRAL DISC DEGENERATION, LUMBAR REGION: Status: RESOLVED | Noted: 2023-04-26 | Resolved: 2025-07-15

## 2025-07-15 PROBLEM — M25.562 LEFT KNEE PAIN: Status: RESOLVED | Noted: 2023-04-26 | Resolved: 2025-07-15

## 2025-07-15 PROBLEM — M99.03 SOMATIC DYSFUNCTION OF LUMBAR REGION: Status: RESOLVED | Noted: 2023-04-26 | Resolved: 2025-07-15

## 2025-07-15 PROBLEM — M50.30 OTHER CERVICAL DISC DEGENERATION, UNSPECIFIED CERVICAL REGION: Status: RESOLVED | Noted: 2023-04-26 | Resolved: 2025-07-15

## 2025-07-15 PROBLEM — S76.011A TEAR OF RIGHT GLUTEUS MEDIUS TENDON: Status: RESOLVED | Noted: 2023-04-26 | Resolved: 2025-07-15

## 2025-07-15 PROBLEM — R93.5 ABDOMINAL ULTRASOUND, ABNORMAL: Status: RESOLVED | Noted: 2023-04-26 | Resolved: 2025-07-15

## 2025-07-15 PROBLEM — R11.2 NAUSEA AND VOMITING IN ADULT: Status: RESOLVED | Noted: 2023-04-26 | Resolved: 2025-07-15

## 2025-07-15 PROBLEM — R10.84 GENERALIZED ABDOMINAL PAIN: Status: RESOLVED | Noted: 2023-04-26 | Resolved: 2025-07-15

## 2025-07-15 PROBLEM — G25.9 MOVEMENT DISORDER: Status: RESOLVED | Noted: 2023-04-26 | Resolved: 2025-07-15

## 2025-07-15 PROBLEM — R22.0 FACIAL SWELLING: Status: RESOLVED | Noted: 2023-04-26 | Resolved: 2025-07-15

## 2025-07-15 PROBLEM — M62.81 MUSCLE WEAKNESS: Status: RESOLVED | Noted: 2023-04-26 | Resolved: 2025-07-15

## 2025-07-15 PROBLEM — R29.898 RIGHT ARM WEAKNESS: Status: RESOLVED | Noted: 2023-04-26 | Resolved: 2025-07-15

## 2025-07-15 PROBLEM — M25.551 HIP PAIN, RIGHT: Status: RESOLVED | Noted: 2023-04-26 | Resolved: 2025-07-15

## 2025-07-15 PROBLEM — R10.11 RUQ PAIN: Status: RESOLVED | Noted: 2023-04-26 | Resolved: 2025-07-15

## 2025-07-15 PROBLEM — M79.609 PAIN IN EXTREMITY: Status: RESOLVED | Noted: 2023-04-26 | Resolved: 2025-07-15

## 2025-07-15 PROBLEM — R10.31 RIGHT GROIN PAIN: Status: RESOLVED | Noted: 2023-04-26 | Resolved: 2025-07-15

## 2025-07-15 PROBLEM — S40.019A SHOULDER CONTUSION: Status: RESOLVED | Noted: 2023-04-26 | Resolved: 2025-07-15

## 2025-07-15 PROBLEM — R21 RASH: Status: RESOLVED | Noted: 2023-04-26 | Resolved: 2025-07-15

## 2025-07-15 PROBLEM — R10.13 ACUTE EPIGASTRIC PAIN: Status: RESOLVED | Noted: 2023-04-26 | Resolved: 2025-07-15

## 2025-07-15 PROBLEM — K86.9 DISORDER OF PANCREATIC DUCT (HHS-HCC): Status: RESOLVED | Noted: 2023-04-26 | Resolved: 2025-07-15

## 2025-07-15 PROBLEM — S46.919A SHOULDER STRAIN: Status: RESOLVED | Noted: 2023-04-26 | Resolved: 2025-07-15

## 2025-07-15 LAB
POC APPEARANCE, URINE: CLEAR
POC BILIRUBIN, URINE: NEGATIVE
POC BLOOD, URINE: ABNORMAL
POC COLOR, URINE: YELLOW
POC GLUCOSE, URINE: NEGATIVE MG/DL
POC KETONES, URINE: NEGATIVE MG/DL
POC LEUKOCYTES, URINE: NEGATIVE
POC NITRITE,URINE: NEGATIVE
POC PH, URINE: 5.5 PH
POC PROTEIN, URINE: NEGATIVE MG/DL
POC SPECIFIC GRAVITY, URINE: >=1.03
POC UROBILINOGEN, URINE: 0.2 EU/DL

## 2025-07-15 PROCEDURE — 99214 OFFICE O/P EST MOD 30 MIN: CPT

## 2025-07-15 PROCEDURE — 3078F DIAST BP <80 MM HG: CPT

## 2025-07-15 PROCEDURE — 3008F BODY MASS INDEX DOCD: CPT

## 2025-07-15 PROCEDURE — G0439 PPPS, SUBSEQ VISIT: HCPCS

## 2025-07-15 PROCEDURE — 1036F TOBACCO NON-USER: CPT

## 2025-07-15 PROCEDURE — 81003 URINALYSIS AUTO W/O SCOPE: CPT

## 2025-07-15 PROCEDURE — 3077F SYST BP >= 140 MM HG: CPT

## 2025-07-15 RX ORDER — GABAPENTIN 100 MG/1
200 CAPSULE ORAL 2 TIMES DAILY
Qty: 360 CAPSULE | Refills: 3 | Status: SHIPPED | OUTPATIENT
Start: 2025-07-15 | End: 2026-07-15

## 2025-07-15 RX ORDER — ATORVASTATIN CALCIUM 40 MG/1
40 TABLET, FILM COATED ORAL DAILY
Qty: 90 TABLET | Refills: 3 | Status: SHIPPED | OUTPATIENT
Start: 2025-07-15

## 2025-07-15 RX ORDER — PANTOPRAZOLE SODIUM 40 MG/1
40 TABLET, DELAYED RELEASE ORAL
Qty: 90 TABLET | Refills: 3 | Status: SHIPPED | OUTPATIENT
Start: 2025-07-15

## 2025-07-15 RX ORDER — SUMATRIPTAN SUCCINATE 50 MG/1
50 TABLET ORAL ONCE AS NEEDED
Qty: 27 TABLET | Refills: 3 | Status: SHIPPED | OUTPATIENT
Start: 2025-07-15

## 2025-07-15 ASSESSMENT — ACTIVITIES OF DAILY LIVING (ADL)
MANAGING_FINANCES: INDEPENDENT
TAKING_MEDICATION: INDEPENDENT
DOING_HOUSEWORK: INDEPENDENT
DRESSING: INDEPENDENT
GROCERY_SHOPPING: INDEPENDENT
BATHING: INDEPENDENT

## 2025-07-15 ASSESSMENT — ENCOUNTER SYMPTOMS
OCCASIONAL FEELINGS OF UNSTEADINESS: 0
ARTHRALGIAS: 1
LOSS OF SENSATION IN FEET: 0
BACK PAIN: 1
NECK PAIN: 1
JOINT SWELLING: 1
MYALGIAS: 1
NECK STIFFNESS: 1
HEADACHES: 1
DEPRESSION: 0
FLANK PAIN: 1

## 2025-07-15 NOTE — ASSESSMENT & PLAN NOTE
"Increase cervical pain that is radiating down arms. Difficulty with gripping things with hands. Pain to hands \"zapping\" at times.   Orders:    CT cervical spine wo IV contrast; Future    "

## 2025-07-15 NOTE — ASSESSMENT & PLAN NOTE
Orders:    gabapentin (Neurontin) 100 mg capsule; Take 2 capsules (200 mg) by mouth 2 times a day. TAKE 3 CAPSULES THREE TIMES DAILYTAKE 3 CAPSULES THREE TIMES DAILY

## 2025-07-15 NOTE — ASSESSMENT & PLAN NOTE
Due for lab work, did not completed last ordered. Continue on atorvastatin 40mg daily. Reminded to get blood work.   Orders:    atorvastatin (Lipitor) 40 mg tablet; Take 1 tablet (40 mg) by mouth once daily.

## 2025-07-15 NOTE — PROGRESS NOTES
"Subjective   Reason for Visit: Mayela Carson is an 55 y.o. female here for a Medicare Wellness visit.     Past Medical, Surgical, and Family History reviewed and updated in chart.    Reviewed all medications by prescribing practitioner or clinical pharmacist (such as prescriptions, OTCs, herbal therapies and supplements) and documented in the medical record.    HPI   55-year-old female presents for Medicare Wellness visit. PMH HTN. DLD, PVD, TMJ, GERD, OAB, fibromyalgia, cervical myelopathy, CNS demyelination, cervical spondylosis, spasticity and nicotine dependence.      Would like to get back on gabapentin. She has not taken any since February. Reports that she had a surplus from her mail-order pharmacy and \"finished those all up\" at the end of February.     Right side pain, beginning of June, had a UTI and was treated but the pain still remains. Most the pain is in the right flank and it feels it comes around to the front. Breathing makes it worse, moving makes it better or sitting still.  Urine dip clear.   Reports bilateral hand pain,  weakness. When going things states get the \"zingers\". Feels like right hand trys to contract up, she has to force it to stay open. Has follows with neurology and pain management but never a spinal surgeon.     Follows with Tevin Wright for pain management through [facility].   Has been getting ablations, had a saddle block to cervical spine.         Patient Care Team:  YANELI Hayden-CNP as PCP - General (Family Medicine)  Ankita Beckham MD as PCP - [employer] PCP     Review of Systems   Genitourinary:  Positive for flank pain and urgency.   Musculoskeletal:  Positive for arthralgias, back pain, gait problem, joint swelling, myalgias, neck pain and neck stiffness.   Neurological:  Positive for headaches.   All other systems reviewed and are negative.      Objective   Vitals:  /66   Pulse 65   Ht 1.6 m (5' 2.99\")   Wt 63 kg (139 lb)  "  SpO2 94%   BMI 24.63 kg/m²       Physical Exam  Constitutional:       Appearance: Normal appearance.   HENT:      Head: Normocephalic and atraumatic.      Nose: Nose normal.      Mouth/Throat:      Mouth: Mucous membranes are moist.      Pharynx: Oropharynx is clear.   Eyes:      Pupils: Pupils are equal, round, and reactive to light.   Cardiovascular:      Rate and Rhythm: Normal rate and regular rhythm.      Pulses: Normal pulses.      Heart sounds: Normal heart sounds.   Pulmonary:      Effort: Pulmonary effort is normal.      Breath sounds: Normal breath sounds.   Abdominal:      General: Bowel sounds are normal.      Palpations: Abdomen is soft.   Musculoskeletal:      Right hand: Decreased strength.      Left hand: Decreased strength.      Cervical back: Spasms and tenderness present. Decreased range of motion.      Thoracic back: Spasms and tenderness present.   Skin:     General: Skin is warm and dry.   Neurological:      General: No focal deficit present.      Mental Status: She is alert and oriented to person, place, and time.   Psychiatric:         Mood and Affect: Mood normal.         Behavior: Behavior normal.         Thought Content: Thought content normal.         Judgment: Judgment normal.       Assessment & Plan  Health care maintenance    Orders:    Follow Up In [facility] - PCP - Health Maintenance    Follow Up In [facility] - PCP - Established; Future    Intractable migraine without aura and without status migrainosus  Assessed, endorses imitrex works. Has had to use more the past week due to increase in headache. Is having cervical neck pain, which makes headaches worse.   Orders:    SUMAtriptan (Imitrex) 50 mg tablet; Take 1 tablet (50 mg) by mouth 1 time if needed for migraine for up to 108 doses.    Dyslipidemia  Due for lab work, did not completed last ordered. Continue on atorvastatin 40mg daily. Reminded to get blood work.   Orders:    atorvastatin (Lipitor) 40 mg  "tablet; Take 1 tablet (40 mg) by mouth once daily.    Gastroesophageal reflux disease, unspecified whether esophagitis present  Assessed and stable on protonix daily.   Orders:    pantoprazole (ProtoNix) 40 mg EC tablet; Take 1 tablet (40 mg) by mouth once daily in the morning. Take before meals. Do not crush, chew, or split.    Flank pain  UA negative  Orders:    POCT UA Automated manually resulted    Primary hypertension  Assessed, slightly elevated in office today. Patient in pain. 145/66.       Cervical myelopathy  This condition has been assessed and is worsening. Follows with neurology        CNS demyelination (Multi)  This condition has been assessed and is worsening per patient. Follows with neurology        Chronic biliary pancreatitis (Multi)  Condition is stable. No concerns of at this time. CT from 10/2023 showed The pancreas appears unremarkable without evidence of ductal dilatation or masses.        Cervical somatic dysfunction  Increase cervical pain that is radiating down arms. Difficulty with gripping things with hands. Pain to hands \"zapping\" at times.        CS (cervical spondylosis)  Increase cervical pain that is radiating down arms. Difficulty with gripping things with hands. Pain to hands \"zapping\" at times.   Orders:    CT cervical spine wo IV contrast; Future    Lumbar spondylosis         Thoracic spine pain  Pain to right side of back that radiates around side. If continues to move or sits still pain is less, sudden movements takes breath away.    Orders:    CT thoracic spine wo IV contrast; Future    Carpal tunnel syndrome of left wrist    Orders:    gabapentin (Neurontin) 100 mg capsule; Take 2 capsules (200 mg) by mouth 2 times a day. TAKE 3 CAPSULES THREE TIMES DAILYTAKE 3 CAPSULES THREE TIMES DAILY    Myoneural disorder, unspecified  Follows with neurology.        Chronic kidney disease, stage 3a (Multi)  Last GFR 54, due for lab work. ordered       Medicare annual wellness visit, " subsequent  Mammogram due 4/16/2026  Colonsocopy due 3/10/2030    Anticipatory guidance, age-appropriate vaccines, routine screenings exams, health promotion and prevention discussed            This note is scribed by Fanta Swan, Student NP, for Fanta Frias, APRN-CNP

## 2025-07-15 NOTE — PATIENT INSTRUCTIONS
I am ordering labs for you to get when you are fasting (meaning nothing to eat or drink for at least 12 hours before, water and black coffee are okay). Once we get the results, someone from the office will call you. If you do not hear from someone within one week of having your blood drawn, please call us [phone] so that we can go over the results.    It is recommend that you consume a balanced diet to include: fruits, a variety of vegetables (dark green, red and orange, legumes like beans and peas, starch, other), grains (at least half of which are whole grains), fat-free or low-fat dairy including milk,cheese, or fortified soy beverages, and proteins such as lean means, poultry, fish, eggs, nuts, seeds.   Limit processed foods, saturated and trans fats, added sugars and sodium (salt).     It is recommended that you get at least 150min exercise every week. More is even better. Moderate activities are activities that get your heart beating faster but you are still able to carry on a conversation. Vigorous activities will have you take breaths after a few words. You should also include two days of muscle strengthening activities to include all major muscle groups (arms, shoulders, chest, abdomen, back, hips and legs)    Benefits of keeping active include:  Lowering your risk of developing type II diabetes and some cancers  Control your blood pressure  Maintain a healthy weight  Improving mood and managing stress  Improving sleep

## 2025-07-15 NOTE — ASSESSMENT & PLAN NOTE
Assessed and stable on protonix daily.   Orders:    pantoprazole (ProtoNix) 40 mg EC tablet; Take 1 tablet (40 mg) by mouth once daily in the morning. Take before meals. Do not crush, chew, or split.

## 2025-07-15 NOTE — ASSESSMENT & PLAN NOTE
"Increase cervical pain that is radiating down arms. Difficulty with gripping things with hands. Pain to hands \"zapping\" at times.        "

## 2025-07-21 ENCOUNTER — HOSPITAL ENCOUNTER (OUTPATIENT)
Dept: RADIOLOGY | Facility: CLINIC | Age: 55
Discharge: HOME | End: 2025-07-21
Payer: MEDICARE

## 2025-07-21 DIAGNOSIS — M54.6 THORACIC SPINE PAIN: ICD-10-CM

## 2025-07-21 DIAGNOSIS — M47.812 CS (CERVICAL SPONDYLOSIS): ICD-10-CM

## 2025-07-21 PROCEDURE — 72128 CT CHEST SPINE W/O DYE: CPT

## 2025-07-21 PROCEDURE — 72125 CT NECK SPINE W/O DYE: CPT

## 2025-07-29 ENCOUNTER — PHARMACY VISIT (OUTPATIENT)
Dept: PHARMACY | Facility: CLINIC | Age: 55
End: 2025-07-29
Payer: COMMERCIAL

## 2025-07-29 ENCOUNTER — HOSPITAL ENCOUNTER (EMERGENCY)
Facility: HOSPITAL | Age: 55
Discharge: HOME | End: 2025-07-29
Attending: EMERGENCY MEDICINE
Payer: MEDICARE

## 2025-07-29 ENCOUNTER — APPOINTMENT (OUTPATIENT)
Dept: CARDIOLOGY | Facility: HOSPITAL | Age: 55
End: 2025-07-29
Payer: MEDICARE

## 2025-07-29 ENCOUNTER — APPOINTMENT (OUTPATIENT)
Dept: RADIOLOGY | Facility: HOSPITAL | Age: 55
End: 2025-07-29
Payer: MEDICARE

## 2025-07-29 VITALS
HEART RATE: 83 BPM | SYSTOLIC BLOOD PRESSURE: 153 MMHG | RESPIRATION RATE: 16 BRPM | BODY MASS INDEX: 24.63 KG/M2 | WEIGHT: 139 LBS | TEMPERATURE: 97.3 F | HEIGHT: 63 IN | OXYGEN SATURATION: 97 % | DIASTOLIC BLOOD PRESSURE: 98 MMHG

## 2025-07-29 DIAGNOSIS — R10.13 EPIGASTRIC PAIN: ICD-10-CM

## 2025-07-29 DIAGNOSIS — R51.9 ACUTE NONINTRACTABLE HEADACHE, UNSPECIFIED HEADACHE TYPE: ICD-10-CM

## 2025-07-29 DIAGNOSIS — R11.2 NAUSEA AND VOMITING, UNSPECIFIED VOMITING TYPE: Primary | ICD-10-CM

## 2025-07-29 LAB
ALBUMIN SERPL BCP-MCNC: 5.1 G/DL (ref 3.4–5)
ALP SERPL-CCNC: 60 U/L (ref 33–110)
ALT SERPL W P-5'-P-CCNC: 19 U/L (ref 7–45)
ANION GAP SERPL CALC-SCNC: 14 MMOL/L (ref 10–20)
APPEARANCE UR: CLEAR
AST SERPL W P-5'-P-CCNC: 21 U/L (ref 9–39)
BASOPHILS # BLD AUTO: 0.03 X10*3/UL (ref 0–0.1)
BASOPHILS NFR BLD AUTO: 0.5 %
BILIRUB SERPL-MCNC: 0.8 MG/DL (ref 0–1.2)
BILIRUB UR STRIP.AUTO-MCNC: NEGATIVE MG/DL
BUN SERPL-MCNC: 8 MG/DL (ref 6–23)
CALCIUM SERPL-MCNC: 10.5 MG/DL (ref 8.6–10.3)
CHLORIDE SERPL-SCNC: 101 MMOL/L (ref 98–107)
CO2 SERPL-SCNC: 27 MMOL/L (ref 21–32)
COLOR UR: ABNORMAL
CREAT SERPL-MCNC: 1 MG/DL (ref 0.5–1.05)
EGFRCR SERPLBLD CKD-EPI 2021: 66 ML/MIN/1.73M*2
EOSINOPHIL # BLD AUTO: 0.19 X10*3/UL (ref 0–0.7)
EOSINOPHIL NFR BLD AUTO: 2.9 %
ERYTHROCYTE [DISTWIDTH] IN BLOOD BY AUTOMATED COUNT: 13.1 % (ref 11.5–14.5)
GLUCOSE BLD MANUAL STRIP-MCNC: 147 MG/DL (ref 74–99)
GLUCOSE SERPL-MCNC: 150 MG/DL (ref 74–99)
GLUCOSE UR STRIP.AUTO-MCNC: NORMAL MG/DL
HCT VFR BLD AUTO: 43.6 % (ref 36–46)
HGB BLD-MCNC: 14.7 G/DL (ref 12–16)
HYALINE CASTS #/AREA URNS AUTO: ABNORMAL /LPF
IMM GRANULOCYTES # BLD AUTO: 0.01 X10*3/UL (ref 0–0.7)
IMM GRANULOCYTES NFR BLD AUTO: 0.2 % (ref 0–0.9)
KETONES UR STRIP.AUTO-MCNC: NEGATIVE MG/DL
LEUKOCYTE ESTERASE UR QL STRIP.AUTO: NEGATIVE
LIPASE SERPL-CCNC: 28 U/L (ref 9–82)
LYMPHOCYTES # BLD AUTO: 1.45 X10*3/UL (ref 1.2–4.8)
LYMPHOCYTES NFR BLD AUTO: 22.1 %
MAGNESIUM SERPL-MCNC: 1.78 MG/DL (ref 1.6–2.4)
MCH RBC QN AUTO: 30.1 PG (ref 26–34)
MCHC RBC AUTO-ENTMCNC: 33.7 G/DL (ref 32–36)
MCV RBC AUTO: 89 FL (ref 80–100)
MONOCYTES # BLD AUTO: 0.38 X10*3/UL (ref 0.1–1)
MONOCYTES NFR BLD AUTO: 5.8 %
MUCOUS THREADS #/AREA URNS AUTO: ABNORMAL /LPF
NEUTROPHILS # BLD AUTO: 4.49 X10*3/UL (ref 1.2–7.7)
NEUTROPHILS NFR BLD AUTO: 68.5 %
NITRITE UR QL STRIP.AUTO: NEGATIVE
NRBC BLD-RTO: 0 /100 WBCS (ref 0–0)
PH UR STRIP.AUTO: 5.5 [PH]
PLATELET # BLD AUTO: 388 X10*3/UL (ref 150–450)
POTASSIUM SERPL-SCNC: 3.4 MMOL/L (ref 3.5–5.3)
PROT SERPL-MCNC: 8.2 G/DL (ref 6.4–8.2)
PROT UR STRIP.AUTO-MCNC: ABNORMAL MG/DL
RBC # BLD AUTO: 4.88 X10*6/UL (ref 4–5.2)
RBC # UR STRIP.AUTO: ABNORMAL MG/DL
RBC #/AREA URNS AUTO: ABNORMAL /HPF
SODIUM SERPL-SCNC: 139 MMOL/L (ref 136–145)
SP GR UR STRIP.AUTO: 1.01
SQUAMOUS #/AREA URNS AUTO: ABNORMAL /HPF
UROBILINOGEN UR STRIP.AUTO-MCNC: NORMAL MG/DL
WBC # BLD AUTO: 6.6 X10*3/UL (ref 4.4–11.3)
WBC #/AREA URNS AUTO: ABNORMAL /HPF

## 2025-07-29 PROCEDURE — 83690 ASSAY OF LIPASE: CPT

## 2025-07-29 PROCEDURE — 2500000005 HC RX 250 GENERAL PHARMACY W/O HCPCS

## 2025-07-29 PROCEDURE — 74177 CT ABD & PELVIS W/CONTRAST: CPT | Mod: FOREIGN READ | Performed by: RADIOLOGY

## 2025-07-29 PROCEDURE — 2500000002 HC RX 250 W HCPCS SELF ADMINISTERED DRUGS (ALT 637 FOR MEDICARE OP, ALT 636 FOR OP/ED)

## 2025-07-29 PROCEDURE — 80053 COMPREHEN METABOLIC PANEL: CPT

## 2025-07-29 PROCEDURE — 99285 EMERGENCY DEPT VISIT HI MDM: CPT | Mod: 25 | Performed by: EMERGENCY MEDICINE

## 2025-07-29 PROCEDURE — 74177 CT ABD & PELVIS W/CONTRAST: CPT

## 2025-07-29 PROCEDURE — 83735 ASSAY OF MAGNESIUM: CPT

## 2025-07-29 PROCEDURE — 82947 ASSAY GLUCOSE BLOOD QUANT: CPT

## 2025-07-29 PROCEDURE — 2550000001 HC RX 255 CONTRASTS: Performed by: EMERGENCY MEDICINE

## 2025-07-29 PROCEDURE — RXMED WILLOW AMBULATORY MEDICATION CHARGE

## 2025-07-29 PROCEDURE — 93005 ELECTROCARDIOGRAM TRACING: CPT

## 2025-07-29 PROCEDURE — 96374 THER/PROPH/DIAG INJ IV PUSH: CPT | Mod: 59

## 2025-07-29 PROCEDURE — 2500000004 HC RX 250 GENERAL PHARMACY W/ HCPCS (ALT 636 FOR OP/ED)

## 2025-07-29 PROCEDURE — 85025 COMPLETE CBC W/AUTO DIFF WBC: CPT

## 2025-07-29 PROCEDURE — 96372 THER/PROPH/DIAG INJ SC/IM: CPT

## 2025-07-29 PROCEDURE — 81001 URINALYSIS AUTO W/SCOPE: CPT

## 2025-07-29 PROCEDURE — 36415 COLL VENOUS BLD VENIPUNCTURE: CPT

## 2025-07-29 RX ORDER — METOCLOPRAMIDE HYDROCHLORIDE 5 MG/ML
10 INJECTION INTRAMUSCULAR; INTRAVENOUS ONCE
Status: DISCONTINUED | OUTPATIENT
Start: 2025-07-29 | End: 2025-07-29

## 2025-07-29 RX ORDER — DROPERIDOL 2.5 MG/ML
1.25 INJECTION, SOLUTION INTRAMUSCULAR; INTRAVENOUS ONCE
Status: COMPLETED | OUTPATIENT
Start: 2025-07-29 | End: 2025-07-29

## 2025-07-29 RX ORDER — POTASSIUM CHLORIDE 750 MG/1
40 TABLET, FILM COATED, EXTENDED RELEASE ORAL ONCE
Status: COMPLETED | OUTPATIENT
Start: 2025-07-29 | End: 2025-07-29

## 2025-07-29 RX ORDER — ONDANSETRON 4 MG/1
4 TABLET, ORALLY DISINTEGRATING ORAL EVERY 8 HOURS PRN
Qty: 20 TABLET | Refills: 0 | Status: SHIPPED | OUTPATIENT
Start: 2025-07-29

## 2025-07-29 RX ORDER — LIDOCAINE 560 MG/1
1 PATCH PERCUTANEOUS; TOPICAL; TRANSDERMAL ONCE
Status: DISCONTINUED | OUTPATIENT
Start: 2025-07-29 | End: 2025-07-29 | Stop reason: HOSPADM

## 2025-07-29 RX ADMIN — POTASSIUM CHLORIDE 40 MEQ: 750 TABLET, FILM COATED, EXTENDED RELEASE ORAL at 11:35

## 2025-07-29 RX ADMIN — HYDROMORPHONE HYDROCHLORIDE 0.2 MG: 0.5 INJECTION, SOLUTION INTRAMUSCULAR; INTRAVENOUS; SUBCUTANEOUS at 09:31

## 2025-07-29 RX ADMIN — LIDOCAINE 4% 1 PATCH: 40 PATCH TOPICAL at 09:31

## 2025-07-29 RX ADMIN — IOHEXOL 75 ML: 350 INJECTION, SOLUTION INTRAVENOUS at 10:17

## 2025-07-29 RX ADMIN — DROPERIDOL 1.25 MG: 2.5 INJECTION, SOLUTION INTRAMUSCULAR; INTRAVENOUS at 09:31

## 2025-07-29 ASSESSMENT — PAIN - FUNCTIONAL ASSESSMENT: PAIN_FUNCTIONAL_ASSESSMENT: 0-10

## 2025-07-29 ASSESSMENT — PAIN SCALES - GENERAL
PAINLEVEL_OUTOF10: 7
PAINLEVEL_OUTOF10: 0 - NO PAIN

## 2025-07-29 NOTE — PROGRESS NOTES
Medication Education     Medication education for Mayela Carson was provided to the patient and family for the following medication(s):  Ondansetron       Medication education provided by a Pharmacist:  Dose, frequency, storage Proper dose, indication, possible ADRs How the medication works and benefits of taking it Benefits of taking the medication     Identified potential barriers to education:  None    Method(s) of Education:  Verbal    An opportunity to ask questions and receive answers was provided.     Assessment of understanding the patient and family:  2= meets goals/outcomes    Additional Notes (if applicable):     KARINA SHAW

## 2025-07-29 NOTE — ED PROVIDER NOTES
CC: Vomiting, Abdominal Pain, and Headache (Pt presents to the ED with V/abd pain/headache/diarrhea since Saturday. Pt is a pain clinic pt with multiple diagnosis. Pt states she had a UTI and has similar sx from last time. Denies SOB/CP. GCS 15. )     HPI:  Patient is a 55-year-old female with a past medical history of intractable migraines, DLD, GERD, HTN, chronic biliary pancreatitis, cervical spondylosis, cervical somatic dysfunction, lumbar spondylosis, CKD 3, and chronic thoracic spine pain who presented to the ED for vomiting, abdominal pain, and headache.  Patient notes that her symptoms have been ongoing since Saturday.  She notes that she had multiple episodes of nonbloody emesis and now is just retching.  Notes that she has midepigastric abdominal pain.  Notes that her headache is secondary to her vomiting.  Notes that her headache is pressure-like and not the worst of her life.  Patient also notes ongoing right-sided flank pain over the past month.  Is concerned that she may have a UTI.  Notes that she had similar symptoms when she previously had a UTI.  Her primary care physician ordered a CT of the C-spine and T-spine without acute osseous process noted.  Notes that she uses cannabis daily for chronic pain.  Last smoked cannabis last night.  Patient did note taking her Suboxone this denied fevers, chills, chest pain, difficulty breathing, cough, congestion, trauma, falls, changes in vision, changes in hearing, numbness, tingling, and weakness.    Limitations to history: None  Independent historian(s): Patient  Records Reviewed: Recent available ED and inpatient notes reviewed in EMR.    PMHx/PSHx:  Per HPI.   - has a past medical history of Abdominal ultrasound, abnormal (04/26/2023), Actinic keratosis (08/30/2017), Acute epigastric pain (04/26/2023), Acute superficial gastritis (04/26/2023), Anxiety disorder, unspecified, Cancer involving uterine cervix by direct extension from vagina (Multi)  (04/26/2023), Chronic pancreatitis (Multi) (04/26/2023), Disorder of pancreatic duct ([facility]) (04/26/2023), Facial swelling (04/26/2023), Generalized abdominal pain (04/26/2023), Hip pain, right (04/26/2023), History of alcoholism (Multi) (04/26/2023), History of cervical cancer (10/15/2010), Itchy scalp (11/14/2023), Labral tear of hip, degenerative (04/26/2023), Left knee pain (04/26/2023), Movement disorder (04/26/2023), Muscle spasm (04/26/2023), Muscle weakness (04/26/2023), Nausea and vomiting in adult (04/26/2023), Neck pain (11/19/2010), Nonallopathic lesion of thoracolumbar region (01/10/2011), Otalgia, right (10/15/2020), Other cervical disc degeneration, unspecified cervical region (04/26/2023), Other seborrheic keratosis (08/30/2017), Pain (01/10/2011), Pain in extremity (11/14/2023), Pain in extremity (04/26/2023), Personal history of malignant neoplasm, unspecified, Personal history of other diseases of the musculoskeletal system and connective tissue (11/20/2019), Rash (04/26/2023), Rib pain on right side (11/14/2023), Right arm weakness (04/26/2023), Right flank pain (11/14/2023), Right groin pain (04/26/2023), Right shoulder pain (04/26/2023), RUQ pain (04/26/2023), Scalp psoriasis (11/14/2023), Shoulder contusion (04/26/2023), Shoulder strain (04/26/2023), Skin changes due to chronic exposure to nonionizing radiation, unspecified (08/30/2017), Somatic dysfunction of lumbar region (04/26/2023), Tear of right gluteus medius tendon (04/26/2023), Thoracic spine pain (04/26/2023), Urinary urgency (11/14/2023), UTI (urinary tract infection) (11/14/2023), and Vulvar rash (11/14/2023).  - has a past surgical history that includes Other surgical history (09/14/2016); Hysterectomy (09/14/2016); and Augmentation mammaplasty (22).    Medications:  Reviewed in EMR. See EMR for complete list of medications and doses.    Allergies:  Buprenorphine and Pregabalin    Social History:  - Tobacco:  reports that she  has quit smoking. Her smoking use included cigarettes. She has never used smokeless tobacco.   - Alcohol:  reports no history of alcohol use.   - Illicit Drugs:  reports current drug use. Drug: Marijuana.     ROS:  Per HPI.       ???????????????????????????????????????????????????????????????  Triage Vitals:  T 36.3 °C (97.3 °F)  HR 98  BP (!) 150/97  RR 20  O2 99 %      Physical Exam  Vitals and nursing note reviewed.   Constitutional:       Comments: Retching   HENT:      Head: Normocephalic and atraumatic.      Nose: Nose normal.      Mouth/Throat:      Mouth: Mucous membranes are moist.     Eyes:      Conjunctiva/sclera: Conjunctivae normal.       Cardiovascular:      Rate and Rhythm: Normal rate and regular rhythm.      Pulses: Normal pulses.   Pulmonary:      Effort: Pulmonary effort is normal. No respiratory distress.      Breath sounds: Normal breath sounds.   Abdominal:      Palpations: Abdomen is soft.      Tenderness: There is abdominal tenderness.      Comments: Midepigastric and right flank TTP.     Skin:     General: Skin is warm.     Neurological:      General: No focal deficit present.      Mental Status: She is alert and oriented to person, place, and time.      Cranial Nerves: No cranial nerve deficit.         ???????????????????????????????????????????????????????????????  Labs:   Labs Reviewed   URINALYSIS WITH REFLEX CULTURE AND MICROSCOPIC    Narrative:     The following orders were created for panel order Urinalysis with Reflex Culture and Microscopic.  Procedure                               Abnormality         Status                     ---------                               -----------         ------                     Urinalysis with Reflex C...[[id number]]                                                 Extra Urine Gray Tube[[id number]]                                                         Please view results for these tests on the individual orders.   CBC WITH AUTO DIFFERENTIAL    MAGNESIUM   COMPREHENSIVE METABOLIC PANEL   LIPASE   URINALYSIS WITH REFLEX CULTURE AND MICROSCOPIC   EXTRA URINE GRAY TUBE        Imaging:   No orders to display        MDM:  Patient is a 55-year-old female with a past medical history of intractable migraines, DLD, GERD, HTN, chronic biliary pancreatitis, cervical spondylosis, cervical somatic dysfunction, lumbar spondylosis, CKD 3, and chronic thoracic spine pain who presented to the ED for vomiting, abdominal pain, and headache.  Patient presented HDS.  Physical exam findings of for midepigastric and right flank TTP.  Low clinical concern for SBO, acute mesenteric ischemia, and acute aortic process including aortic dissection.  Patient was noted to be significantly retching.  Patient administered IM droperidol for significant retching.  Patient noted significant improvement of symptoms after droperidol.  Patient administered Dilaudid for analgesia.  UA, basic labs, lipase, magnesium, and CT AP ordered with significant concern for renal, biliary, and pancreatic pathology.  Low clinical concern for ICH or other acute neurologic process.  Please see ED course and disposition for remainder of care.    ED Course:  ED Course as of 07/29/25 1823   Tue Jul 29, 2025   0953 CBC and Auto Differential  CBC without leukocytosis or anemia. [MH]   1000 Metabolic panel significant for mild hypokalemia.  Potassium repleted.  Lipase and magnesium unremarkable. [MH]   1004 EKG: Rate is 85, sinus rhythm, right axis deviation, no interval prolongation, no st elevation or depression.  When compared to EKG on 4/22/2023 review of EKG does not show any signs of STEMI, complete heart block, asystole, V-fib. [MH]   1037 Urinalysis with Reflex Culture and Microscopic(!)  UA with trace hematuria.  No findings concerning for UTI. [MH]   1124 CT abdomen pelvis w IV contrast  IMPRESSION:  Negative for bowel obstruction. Mild wall thickening within the  transverse colon and ascending colon  suggests colitis.  Normal appendix.  CT appearance of the gallbladder is normal.  Negative for pancreatitis.  No renal stones or hydronephrosis.   [MH]      ED Course User Index  [MH] Bari Welch MD         Diagnoses as of 07/29/25 1823   Nausea and vomiting, unspecified vomiting type   Epigastric pain   Acute nonintractable headache, unspecified headache type       Social Determinants Limiting Care:  None identified    Disposition:  Upon reevaluation, patient tolerated p.o. and ambulate in the emergency department.  Notes feeling significantly better.  Concern for cannabis hyperemesis syndrome.  Discussed cannabis cessation and in identifying other etiologies of treatment for chronic pain.  Patient notes that she will follow-up with her pain specialist.  Discussed ED findings, plan for outpatient primary care and pain follow-up, and strict return precautions for any new or worsening symptoms including not limited to concerning pain, vomiting, and inability to tolerate p.o.  Patient stated understanding and agreement with the plan.  All questions were answered.  Patient discharged in stable condition.    Bari Welch MD   Emergency Medicine PGY-3  [facility]    Comment: Please note this report has been produced using speech recognition software and may contain errors related to that system including errors in grammar, punctuation, and spelling as well as words and phrases that may be inappropriate.  If there are any questions or concerns please feel free to contact the dictating provider for clarification.    Procedures ? [facility] last updated 7/29/2025 9:16 AM        Bari Welch MD  Resident  07/29/25 [id number]

## 2025-07-29 NOTE — LETTER
July 29, 2025    Patient: Mayela Carson   YOB: 1970   Date of Visit: 7/29/2025       To Whom It May Concern:    Mayela Carson was seen and treated in our emergency department on 7/29/2025.     If you have any questions or concerns, please don't hesitate to call.              CC: No Recipients

## 2025-07-29 NOTE — DISCHARGE INSTRUCTIONS
You presented to the ED for headache, abdominal pain, and vomiting.  No acute findings were noted on labs and imaging.  No findings concerning for UTI, kidney stones, or abnormalities in electrolytes.  Follow-up with primary care within the next week.  Follow-up with pain management as needed.  Please return to the emergency department for any new or worsening including but not limited to concerning pain and vomiting.

## 2025-07-30 LAB
ATRIAL RATE: 85 BPM
HOLD SPECIMEN: NORMAL
P AXIS: 145 DEGREES
PR INTERVAL: 135 MS
Q ONSET: 252 MS
QRS COUNT: 14 BEATS
QRS DURATION: 89 MS
QT INTERVAL: 340 MS
QTC CALCULATION(BAZETT): 405 MS
QTC FREDERICIA: 381 MS
R AXIS: 147 DEGREES
T AXIS: -35 DEGREES
T OFFSET: 422 MS
VENTRICULAR RATE: 85 BPM

## 2025-09-02 DIAGNOSIS — R10.11 RUQ PAIN: ICD-10-CM

## 2025-09-02 DIAGNOSIS — G56.02 CARPAL TUNNEL SYNDROME OF LEFT WRIST: ICD-10-CM

## 2025-09-02 RX ORDER — GABAPENTIN 100 MG/1
200 CAPSULE ORAL 2 TIMES DAILY
Qty: 360 CAPSULE | Refills: 3 | Status: SHIPPED | OUTPATIENT
Start: 2025-09-02 | End: 2025-10-02

## 2025-09-02 RX ORDER — DICYCLOMINE HYDROCHLORIDE 10 MG/1
10 CAPSULE ORAL EVERY 6 HOURS PRN
Qty: 270 CAPSULE | Refills: 0 | Status: SHIPPED | OUTPATIENT
Start: 2025-09-02

## 2025-10-22 ENCOUNTER — APPOINTMENT (OUTPATIENT)
Dept: PRIMARY CARE | Facility: CLINIC | Age: 55
End: 2025-10-22
Payer: MEDICARE